# Patient Record
Sex: FEMALE | Race: WHITE | Employment: PART TIME | URBAN - METROPOLITAN AREA
[De-identification: names, ages, dates, MRNs, and addresses within clinical notes are randomized per-mention and may not be internally consistent; named-entity substitution may affect disease eponyms.]

---

## 2017-01-23 ENCOUNTER — OFFICE VISIT (OUTPATIENT)
Dept: FAMILY MEDICINE CLINIC | Facility: CLINIC | Age: 62
End: 2017-01-23

## 2017-01-23 VITALS
SYSTOLIC BLOOD PRESSURE: 120 MMHG | HEIGHT: 61 IN | HEART RATE: 76 BPM | BODY MASS INDEX: 36.44 KG/M2 | RESPIRATION RATE: 16 BRPM | DIASTOLIC BLOOD PRESSURE: 76 MMHG | WEIGHT: 193 LBS

## 2017-01-23 DIAGNOSIS — D22.9 CHANGE IN MOLE: Primary | ICD-10-CM

## 2017-01-23 PROCEDURE — 99212 OFFICE O/P EST SF 10 MIN: CPT | Performed by: FAMILY MEDICINE

## 2017-01-24 NOTE — PROGRESS NOTES
Patient presents with:  Derm Problem: skin tags- rib area and spot on neck  Diabetic Eye Exam: Pt states has appt 3/2017- letter sent to Pt MY CHART- Pt will bring to appt      HPI:   This is a 63year old female coming in for miles, skin tags on nec and c note and vitals reviewed.    small lesions, non cancerous on skin in chest  ASSESSMENT AND PLAN:     Problem List Items Addressed This Visit     None      Visit Diagnoses     Change in mole    -  Primary          Reassurance on these benign moles, will foll

## 2017-02-08 ENCOUNTER — APPOINTMENT (OUTPATIENT)
Dept: LAB | Age: 62
End: 2017-02-08
Attending: FAMILY MEDICINE
Payer: COMMERCIAL

## 2017-02-08 DIAGNOSIS — E11.9 TYPE 2 DIABETES MELLITUS WITHOUT COMPLICATION, WITHOUT LONG-TERM CURRENT USE OF INSULIN (HCC): ICD-10-CM

## 2017-02-08 DIAGNOSIS — E03.9 ACQUIRED HYPOTHYROIDISM: ICD-10-CM

## 2017-02-08 LAB
ALBUMIN SERPL-MCNC: 4.1 G/DL (ref 3.5–4.8)
ALP LIVER SERPL-CCNC: 71 U/L (ref 46–118)
ALT SERPL-CCNC: 30 U/L (ref 14–54)
AST SERPL-CCNC: 17 U/L (ref 15–41)
BILIRUB SERPL-MCNC: 0.6 MG/DL (ref 0.1–2)
BUN BLD-MCNC: 17 MG/DL (ref 8–20)
CALCIUM BLD-MCNC: 8.6 MG/DL (ref 8.3–10.3)
CHLORIDE: 101 MMOL/L (ref 101–111)
CHOLEST SMN-MCNC: 142 MG/DL (ref ?–200)
CO2: 28 MMOL/L (ref 22–32)
CREAT BLD-MCNC: 0.65 MG/DL (ref 0.55–1.02)
CREAT UR-SCNC: 34.6 MG/DL
EST. AVERAGE GLUCOSE BLD GHB EST-MCNC: 146 MG/DL (ref 68–126)
FREE T4: 1.2 NG/DL (ref 0.9–1.8)
GLUCOSE BLD-MCNC: 113 MG/DL (ref 70–99)
HBA1C MFR BLD HPLC: 6.7 % (ref ?–5.7)
HDLC SERPL-MCNC: 75 MG/DL (ref 45–?)
HDLC SERPL: 1.89 {RATIO} (ref ?–4.44)
LDLC SERPL CALC-MCNC: 48 MG/DL (ref ?–130)
M PROTEIN MFR SERPL ELPH: 7 G/DL (ref 6.1–8.3)
MICROALBUMIN UR-MCNC: <0.5 MG/DL
NONHDLC SERPL-MCNC: 67 MG/DL (ref ?–130)
POTASSIUM SERPL-SCNC: 4 MMOL/L (ref 3.6–5.1)
SODIUM SERPL-SCNC: 136 MMOL/L (ref 136–144)
TRIGLYCERIDES: 96 MG/DL (ref ?–150)
TSI SER-ACNC: 0.48 MIU/ML (ref 0.35–5.5)
VLDL: 19 MG/DL (ref 5–40)

## 2017-02-08 PROCEDURE — 80061 LIPID PANEL: CPT

## 2017-02-08 PROCEDURE — 82570 ASSAY OF URINE CREATININE: CPT

## 2017-02-08 PROCEDURE — 84443 ASSAY THYROID STIM HORMONE: CPT

## 2017-02-08 PROCEDURE — 82043 UR ALBUMIN QUANTITATIVE: CPT

## 2017-02-08 PROCEDURE — 36415 COLL VENOUS BLD VENIPUNCTURE: CPT

## 2017-02-08 PROCEDURE — 83036 HEMOGLOBIN GLYCOSYLATED A1C: CPT

## 2017-02-08 PROCEDURE — 84439 ASSAY OF FREE THYROXINE: CPT

## 2017-02-08 PROCEDURE — 80053 COMPREHEN METABOLIC PANEL: CPT

## 2017-02-15 ENCOUNTER — OFFICE VISIT (OUTPATIENT)
Dept: FAMILY MEDICINE CLINIC | Facility: CLINIC | Age: 62
End: 2017-02-15

## 2017-02-15 VITALS
HEART RATE: 64 BPM | BODY MASS INDEX: 36.06 KG/M2 | TEMPERATURE: 97 F | RESPIRATION RATE: 16 BRPM | HEIGHT: 61 IN | SYSTOLIC BLOOD PRESSURE: 122 MMHG | DIASTOLIC BLOOD PRESSURE: 82 MMHG | WEIGHT: 191 LBS

## 2017-02-15 DIAGNOSIS — E78.5 HYPERLIPIDEMIA WITH TARGET LDL LESS THAN 70: Chronic | ICD-10-CM

## 2017-02-15 DIAGNOSIS — I10 ESSENTIAL HYPERTENSION: Chronic | ICD-10-CM

## 2017-02-15 DIAGNOSIS — E03.9 ACQUIRED HYPOTHYROIDISM: ICD-10-CM

## 2017-02-15 DIAGNOSIS — E11.9 TYPE 2 DIABETES MELLITUS WITHOUT COMPLICATION, WITHOUT LONG-TERM CURRENT USE OF INSULIN (HCC): Primary | ICD-10-CM

## 2017-02-15 PROCEDURE — 99214 OFFICE O/P EST MOD 30 MIN: CPT | Performed by: FAMILY MEDICINE

## 2017-02-15 NOTE — PROGRESS NOTES
HPI:   Taqueria Lin is a 63year old female who presents for recheck of her diabetes.     Patient presents with:  Diabetes: 6 month f/u  Eye letter given     Diabetes Care Dilated Eye Exam due on 10/21/2016  Diabetes Care Foot Exam due on 02/11/2017     Macho Mora lb  01/23/17 : 193 lb  08/17/16 : 187 lb 3.2 oz    BP Readings from Last 3 Encounters:  02/15/17 : 122/82  01/23/17 : 120/76  08/17/16 : 130/80        HPI     Past History:   She  has a past medical history of Cataract (5/15/2014);  Diabetes [facility]) (May 2015 Take 1 Tab by mouth daily. B Complex Vitamins (B COMPLEX 1 OR) Take by mouth daily. No current facility-administered medications on file prior to visit.     Thyroid  (most recent labs)     Lab Results  Component Value Date/Time   TSH 0.479 02/08/201 noted. Recommendations are: continue present meds, lose weight by increased dietary compliance and exercise, see ophthalmology soon, check feet daily and will check labs as ordered.      As for her hypertension, Blood Pressure is well controlled, no sig

## 2017-02-15 NOTE — ASSESSMENT & PLAN NOTE
Stable, Continue present management.     Thyroid  (most recent labs)     Lab Results  Component Value Date/Time   TSH 0.479 02/08/2017 09:16 AM   T4F 1.2 02/08/2017 09:16 AM         Endocrine Medications          Levothyroxine Sodium 75 MCG Oral Tab

## 2017-04-01 ENCOUNTER — OFFICE VISIT (OUTPATIENT)
Dept: FAMILY MEDICINE CLINIC | Facility: CLINIC | Age: 62
End: 2017-04-01

## 2017-04-01 VITALS
WEIGHT: 195 LBS | HEART RATE: 81 BPM | DIASTOLIC BLOOD PRESSURE: 60 MMHG | OXYGEN SATURATION: 98 % | RESPIRATION RATE: 18 BRPM | SYSTOLIC BLOOD PRESSURE: 120 MMHG | BODY MASS INDEX: 37 KG/M2

## 2017-04-01 DIAGNOSIS — N30.01 ACUTE CYSTITIS WITH HEMATURIA: Primary | ICD-10-CM

## 2017-04-01 DIAGNOSIS — R30.9 PAIN PASSING URINE: ICD-10-CM

## 2017-04-01 PROCEDURE — 87086 URINE CULTURE/COLONY COUNT: CPT | Performed by: FAMILY MEDICINE

## 2017-04-01 PROCEDURE — 99213 OFFICE O/P EST LOW 20 MIN: CPT | Performed by: FAMILY MEDICINE

## 2017-04-01 PROCEDURE — 87186 SC STD MICRODIL/AGAR DIL: CPT | Performed by: FAMILY MEDICINE

## 2017-04-01 PROCEDURE — 81003 URINALYSIS AUTO W/O SCOPE: CPT | Performed by: FAMILY MEDICINE

## 2017-04-01 PROCEDURE — 87088 URINE BACTERIA CULTURE: CPT | Performed by: FAMILY MEDICINE

## 2017-04-01 RX ORDER — NITROFURANTOIN 25; 75 MG/1; MG/1
100 CAPSULE ORAL 2 TIMES DAILY
Qty: 14 CAPSULE | Refills: 0 | Status: SHIPPED | OUTPATIENT
Start: 2017-04-01 | End: 2017-08-16 | Stop reason: ALTCHOICE

## 2017-04-01 NOTE — PROGRESS NOTES
Kaden Trinidad is a 63year old female. S:  Patient presents today with the following concerns:  · Had chills last night. Thursday with dysuria and polyuria. Hematuria yesterday. Urinary urgency. No abdominal or back pain. No fevers. Nauseated.  No Problem Relation Age of Onset   • Arthritis Brother    • Breast Cancer Mother 78   • Heart Surgery Mother      CABG   • Other[Other] [OTHER] Mother    • Diabetes Sister    • Other[Other] [OTHER] Father      hepatic disorder   • High Cholesterol Daughter (100 mg total) by mouth 2 (two) times daily. Imaging & Consults:  None    Urine sent for culture. She is started on Macrobid 100 mg bid for 7 days. Encouraged fluids, urination when needed. Avoid caffeine.   Follow up if symptoms worsen or do not

## 2017-04-17 ENCOUNTER — TELEPHONE (OUTPATIENT)
Dept: FAMILY MEDICINE CLINIC | Facility: CLINIC | Age: 62
End: 2017-04-17

## 2017-04-17 NOTE — TELEPHONE ENCOUNTER
Patient saw Rebbeca Lesches and was diagnosed with UTI 2 weeks ago 4/1/17, but she is still having trouble when she goes to the bathroom she feels like she still needs to void.

## 2017-04-17 NOTE — TELEPHONE ENCOUNTER
Still having UTI  Symptoms frequency urgency and feeling that she does not empty her bladder after voiding will send to Ward Moya PA-c to see if she wants to treat

## 2017-04-18 RX ORDER — LEVOFLOXACIN 250 MG/1
250 TABLET ORAL DAILY
Qty: 5 TABLET | Refills: 0 | Status: SHIPPED | OUTPATIENT
Start: 2017-04-18 | End: 2017-08-16 | Stop reason: ALTCHOICE

## 2017-04-18 NOTE — TELEPHONE ENCOUNTER
Notified patient of new medication and need to be seen if not improving patient agrees to this plan.

## 2017-08-16 ENCOUNTER — OFFICE VISIT (OUTPATIENT)
Dept: FAMILY MEDICINE CLINIC | Facility: CLINIC | Age: 62
End: 2017-08-16

## 2017-08-16 VITALS
DIASTOLIC BLOOD PRESSURE: 80 MMHG | SYSTOLIC BLOOD PRESSURE: 110 MMHG | WEIGHT: 192.38 LBS | BODY MASS INDEX: 35.86 KG/M2 | HEART RATE: 68 BPM | RESPIRATION RATE: 14 BRPM | HEIGHT: 61.4 IN

## 2017-08-16 DIAGNOSIS — E11.9 TYPE 2 DIABETES MELLITUS WITHOUT COMPLICATION, WITHOUT LONG-TERM CURRENT USE OF INSULIN (HCC): Primary | ICD-10-CM

## 2017-08-16 DIAGNOSIS — E78.5 HYPERLIPIDEMIA WITH TARGET LDL LESS THAN 70: Chronic | ICD-10-CM

## 2017-08-16 DIAGNOSIS — Z12.31 VISIT FOR SCREENING MAMMOGRAM: ICD-10-CM

## 2017-08-16 DIAGNOSIS — E03.9 ACQUIRED HYPOTHYROIDISM: ICD-10-CM

## 2017-08-16 DIAGNOSIS — E11.59 HYPERTENSION ASSOCIATED WITH DIABETES (HCC): ICD-10-CM

## 2017-08-16 DIAGNOSIS — I15.2 HYPERTENSION ASSOCIATED WITH DIABETES (HCC): ICD-10-CM

## 2017-08-16 DIAGNOSIS — I10 ESSENTIAL HYPERTENSION: Chronic | ICD-10-CM

## 2017-08-16 LAB
CARTRIDGE LOT#: ABNORMAL NUMERIC
HEMOGLOBIN A1C: 6.7 % (ref 4.3–5.6)

## 2017-08-16 PROCEDURE — 99214 OFFICE O/P EST MOD 30 MIN: CPT | Performed by: FAMILY MEDICINE

## 2017-08-16 RX ORDER — LEVOTHYROXINE SODIUM 0.07 MG/1
75 TABLET ORAL DAILY
Qty: 90 TABLET | Refills: 3 | Status: SHIPPED | OUTPATIENT
Start: 2017-08-16 | End: 2018-08-01

## 2017-08-16 RX ORDER — LISINOPRIL 5 MG/1
5 TABLET ORAL DAILY
Qty: 90 TABLET | Refills: 3 | Status: SHIPPED | OUTPATIENT
Start: 2017-08-16 | End: 2018-08-01

## 2017-08-16 RX ORDER — ATORVASTATIN CALCIUM 10 MG/1
10 TABLET, FILM COATED ORAL DAILY
Qty: 90 TABLET | Refills: 3 | Status: SHIPPED | OUTPATIENT
Start: 2017-08-16 | End: 2018-08-01

## 2017-08-16 RX ORDER — METFORMIN HYDROCHLORIDE 500 MG/1
1000 TABLET, EXTENDED RELEASE ORAL 2 TIMES DAILY WITH MEALS
Qty: 360 TABLET | Refills: 3 | Status: SHIPPED | OUTPATIENT
Start: 2017-08-16 | End: 2018-08-01

## 2017-08-16 NOTE — PROGRESS NOTES
HPI:   Tammy Matute is a 63year old female who presents for recheck of her diabetes.   Stable labs, but repeat due today with A1c 6.7%  Patient presents with:  Diabetes: 6 month visit - Patient is fasting    Diabetes Care Dilated Eye Exam due on 10/21/201 0.65 02/08/2017 09:16 AM   GFR 97 02/08/2017 09:16 AM         Wt Readings from Last 3 Encounters:  08/16/17 : 192 lb 6.4 oz  04/01/17 : 195 lb  02/15/17 : 191 lb    BP Readings from Last 3 Encounters:  08/16/17 : 110/80  04/01/17 : 120/60  02/15/17 : 122/8 Levothyroxine Sodium 75 MCG Oral Tab Take 1 tablet (75 mcg total) by mouth daily. [DISCONTINUED] Atorvastatin Calcium 10 MG Oral Tab Take 1 tablet (10 mg total) by mouth daily.    [DISCONTINUED] lisinopril 5 MG Oral Tab Take 1 tablet (5 mg total) by mouth noted. Recommendations are: continue present meds, lose weight by increased dietary compliance and exercise, see ophthalmology soon, check feet daily and will check labs as ordered.      As for her hypertension, Blood Pressure is well controlled, no sig MICROALB/CREAT RATIO, RANDOM URINE      Other Visit Diagnoses     Hypertension associated with diabetes ([facility] [address]        Relevant Medications    lisinopril 5 MG Oral Tab    MetFORMIN HCl  MG Oral Tablet 24 Hr    Visit for screening mammogram        Relev

## 2017-09-27 ENCOUNTER — TELEPHONE (OUTPATIENT)
Dept: FAMILY MEDICINE CLINIC | Facility: CLINIC | Age: 62
End: 2017-09-27

## 2017-09-27 NOTE — TELEPHONE ENCOUNTER
Patient is scheduled for R eye surgery on 11/15/17 with Dr. Fransisco Boyd. LM asking patient to contact our office to schedule appointment.

## 2017-11-11 ENCOUNTER — OFFICE VISIT (OUTPATIENT)
Dept: FAMILY MEDICINE CLINIC | Facility: CLINIC | Age: 62
End: 2017-11-11

## 2017-11-11 VITALS
SYSTOLIC BLOOD PRESSURE: 110 MMHG | WEIGHT: 195.38 LBS | RESPIRATION RATE: 16 BRPM | DIASTOLIC BLOOD PRESSURE: 70 MMHG | HEIGHT: 61 IN | TEMPERATURE: 97 F | HEART RATE: 72 BPM | BODY MASS INDEX: 36.89 KG/M2

## 2017-11-11 DIAGNOSIS — I10 ESSENTIAL HYPERTENSION: Chronic | ICD-10-CM

## 2017-11-11 DIAGNOSIS — H25.9 SENILE CATARACT OF RIGHT EYE, UNSPECIFIED AGE-RELATED CATARACT TYPE: Primary | ICD-10-CM

## 2017-11-11 DIAGNOSIS — E03.9 ACQUIRED HYPOTHYROIDISM: ICD-10-CM

## 2017-11-11 DIAGNOSIS — E78.5 HYPERLIPIDEMIA WITH TARGET LDL LESS THAN 70: Chronic | ICD-10-CM

## 2017-11-11 DIAGNOSIS — Z01.818 PREOPERATIVE CLEARANCE: ICD-10-CM

## 2017-11-11 DIAGNOSIS — E11.9 TYPE 2 DIABETES MELLITUS WITHOUT COMPLICATION, WITHOUT LONG-TERM CURRENT USE OF INSULIN (HCC): ICD-10-CM

## 2017-11-11 DIAGNOSIS — Z00.00 LABORATORY EXAMINATION ORDERED AS PART OF A ROUTINE GENERAL MEDICAL EXAMINATION: ICD-10-CM

## 2017-11-11 PROCEDURE — 99243 OFF/OP CNSLTJ NEW/EST LOW 30: CPT | Performed by: FAMILY MEDICINE

## 2017-11-11 RX ORDER — OFLOXACIN 3 MG/ML
SOLUTION/ DROPS OPHTHALMIC
COMMUNITY
Start: 2017-11-01 | End: 2018-02-14 | Stop reason: ALTCHOICE

## 2017-11-11 RX ORDER — PREDNISOLONE ACETATE 10 MG/ML
SUSPENSION/ DROPS OPHTHALMIC
COMMUNITY
Start: 2017-11-01 | End: 2018-02-14 | Stop reason: ALTCHOICE

## 2017-11-11 NOTE — H&P
Lindsey Hebert is a 63year old female who presents for a pre-operative physical exam at the request of Dr. Kaila Magallon for evaluation of preoperative risk. Patient is to have R cataract, to be done by Dr. Kaila Magallon  at [facility] on 11/15/2017.     Pt family history includes Arthritis in her brother, brother and another family member; Breast Cancer (age of onset: 79) in her maternal aunt; Breast Cancer (age of onset: 78) in her mother; Cancer in her maternal grandfather, maternal grandfather, maternal g no dominant or suspicious mass  LUNGS: clear to auscultation  CARDIO: RRR without murmur  GI: good BS's,no masses, HSM or tenderness  : deferred  EXTREMITIES: edema  NEURO: Oriented times three,cranial nerves are intact,motor and sensory are grossly [place] the referring physician, Dr. Jami Doran. She is overall doing well, no cardiac symptoms and very well controlled type 2 diabetes with a normal EKG from last year and very low risk for heart disease.   I do not feel she needs any further lab testing and I

## 2017-11-11 NOTE — PATIENT INSTRUCTIONS
Current Outpatient Prescriptions on File Prior to Visit:  Levothyroxine Sodium 75 MCG Oral Tab OK   atorvastatin 10 MG Oral Tab OK   aspirin 81 MG Oral Tab    Cholecalciferol (VITAMIN D) 1000 UNITS Oral Cap    B Complex Vitamins (B COMPLEX 1 OR)    lisin

## 2017-11-29 PROBLEM — Z98.42: Status: ACTIVE | Noted: 2017-11-29

## 2017-11-29 PROBLEM — Z98.41: Status: ACTIVE | Noted: 2017-11-29

## 2018-02-07 ENCOUNTER — LAB ENCOUNTER (OUTPATIENT)
Dept: LAB | Age: 63
End: 2018-02-07
Attending: FAMILY MEDICINE
Payer: COMMERCIAL

## 2018-02-07 DIAGNOSIS — E03.9 ACQUIRED HYPOTHYROIDISM: ICD-10-CM

## 2018-02-07 DIAGNOSIS — Z00.00 LABORATORY EXAMINATION ORDERED AS PART OF A ROUTINE GENERAL MEDICAL EXAMINATION: ICD-10-CM

## 2018-02-07 DIAGNOSIS — E11.9 TYPE 2 DIABETES MELLITUS WITHOUT COMPLICATION, WITHOUT LONG-TERM CURRENT USE OF INSULIN (HCC): ICD-10-CM

## 2018-02-07 LAB
ALBUMIN SERPL-MCNC: 4.1 G/DL (ref 3.5–4.8)
ALP LIVER SERPL-CCNC: 62 U/L (ref 46–118)
ALT SERPL-CCNC: 31 U/L (ref 14–54)
AST SERPL-CCNC: 17 U/L (ref 15–41)
BASOPHILS # BLD AUTO: 0.05 X10(3) UL (ref 0–0.1)
BASOPHILS NFR BLD AUTO: 1.3 %
BILIRUB SERPL-MCNC: 0.6 MG/DL (ref 0.1–2)
BUN BLD-MCNC: 14 MG/DL (ref 8–20)
CALCIUM BLD-MCNC: 9.5 MG/DL (ref 8.3–10.3)
CHLORIDE: 105 MMOL/L (ref 101–111)
CHOLEST SMN-MCNC: 132 MG/DL (ref ?–200)
CO2: 27 MMOL/L (ref 22–32)
CREAT BLD-MCNC: 0.56 MG/DL (ref 0.55–1.02)
CREAT UR-SCNC: 48.7 MG/DL
EOSINOPHIL # BLD AUTO: 0.2 X10(3) UL (ref 0–0.3)
EOSINOPHIL NFR BLD AUTO: 5.3 %
ERYTHROCYTE [DISTWIDTH] IN BLOOD BY AUTOMATED COUNT: 12.4 % (ref 11.5–16)
EST. AVERAGE GLUCOSE BLD GHB EST-MCNC: 163 MG/DL (ref 68–126)
FREE T4: 1 NG/DL (ref 0.9–1.8)
GLUCOSE BLD-MCNC: 124 MG/DL (ref 70–99)
HBA1C MFR BLD HPLC: 7.3 % (ref ?–5.7)
HCT VFR BLD AUTO: 38.9 % (ref 34–50)
HDLC SERPL-MCNC: 67 MG/DL (ref 45–?)
HDLC SERPL: 1.97 {RATIO} (ref ?–4.44)
HGB BLD-MCNC: 12.7 G/DL (ref 12–16)
IMMATURE GRANULOCYTE COUNT: 0 X10(3) UL (ref 0–1)
IMMATURE GRANULOCYTE RATIO %: 0 %
LDLC SERPL CALC-MCNC: 45 MG/DL (ref ?–130)
LYMPHOCYTES # BLD AUTO: 1.19 X10(3) UL (ref 0.9–4)
LYMPHOCYTES NFR BLD AUTO: 31.6 %
M PROTEIN MFR SERPL ELPH: 6.9 G/DL (ref 6.1–8.3)
MCH RBC QN AUTO: 29.6 PG (ref 27–33.2)
MCHC RBC AUTO-ENTMCNC: 32.6 G/DL (ref 31–37)
MCV RBC AUTO: 90.7 FL (ref 81–100)
MICROALBUMIN UR-MCNC: <0.5 MG/DL
MONOCYTES # BLD AUTO: 0.32 X10(3) UL (ref 0.1–0.6)
MONOCYTES NFR BLD AUTO: 8.5 %
NEUTROPHIL ABS PRELIM: 2.01 X10 (3) UL (ref 1.3–6.7)
NEUTROPHILS # BLD AUTO: 2.01 X10(3) UL (ref 1.3–6.7)
NEUTROPHILS NFR BLD AUTO: 53.3 %
NONHDLC SERPL-MCNC: 65 MG/DL (ref ?–130)
PLATELET # BLD AUTO: 188 10(3)UL (ref 150–450)
POTASSIUM SERPL-SCNC: 4.1 MMOL/L (ref 3.6–5.1)
RBC # BLD AUTO: 4.29 X10(6)UL (ref 3.8–5.1)
RED CELL DISTRIBUTION WIDTH-SD: 40.7 FL (ref 35.1–46.3)
SODIUM SERPL-SCNC: 138 MMOL/L (ref 136–144)
TRIGL SERPL-MCNC: 100 MG/DL (ref ?–150)
TSI SER-ACNC: 0.4 MIU/ML (ref 0.35–5.5)
VLDLC SERPL CALC-MCNC: 20 MG/DL (ref 5–40)
WBC # BLD AUTO: 3.8 X10(3) UL (ref 4–13)

## 2018-02-07 PROCEDURE — 36415 COLL VENOUS BLD VENIPUNCTURE: CPT

## 2018-02-07 PROCEDURE — 82043 UR ALBUMIN QUANTITATIVE: CPT

## 2018-02-07 PROCEDURE — 85025 COMPLETE CBC W/AUTO DIFF WBC: CPT

## 2018-02-07 PROCEDURE — 83036 HEMOGLOBIN GLYCOSYLATED A1C: CPT

## 2018-02-07 PROCEDURE — 84443 ASSAY THYROID STIM HORMONE: CPT

## 2018-02-07 PROCEDURE — 84439 ASSAY OF FREE THYROXINE: CPT

## 2018-02-07 PROCEDURE — 82570 ASSAY OF URINE CREATININE: CPT

## 2018-02-07 PROCEDURE — 80061 LIPID PANEL: CPT

## 2018-02-07 PROCEDURE — 80053 COMPREHEN METABOLIC PANEL: CPT

## 2018-02-14 ENCOUNTER — OFFICE VISIT (OUTPATIENT)
Dept: FAMILY MEDICINE CLINIC | Facility: CLINIC | Age: 63
End: 2018-02-14

## 2018-02-14 VITALS
TEMPERATURE: 98 F | SYSTOLIC BLOOD PRESSURE: 124 MMHG | HEART RATE: 74 BPM | RESPIRATION RATE: 16 BRPM | WEIGHT: 197 LBS | DIASTOLIC BLOOD PRESSURE: 78 MMHG | BODY MASS INDEX: 37.19 KG/M2 | HEIGHT: 61 IN

## 2018-02-14 DIAGNOSIS — I10 ESSENTIAL HYPERTENSION: Chronic | ICD-10-CM

## 2018-02-14 DIAGNOSIS — G35 MULTIPLE SCLEROSIS (HCC): Chronic | ICD-10-CM

## 2018-02-14 DIAGNOSIS — E66.01 SEVERE OBESITY (BMI 35.0-35.9 WITH COMORBIDITY) (HCC): ICD-10-CM

## 2018-02-14 DIAGNOSIS — E11.9 TYPE 2 DIABETES MELLITUS WITHOUT COMPLICATION, WITHOUT LONG-TERM CURRENT USE OF INSULIN (HCC): ICD-10-CM

## 2018-02-14 DIAGNOSIS — E03.9 ACQUIRED HYPOTHYROIDISM: ICD-10-CM

## 2018-02-14 DIAGNOSIS — Z12.31 VISIT FOR SCREENING MAMMOGRAM: ICD-10-CM

## 2018-02-14 DIAGNOSIS — E78.5 HYPERLIPIDEMIA WITH TARGET LDL LESS THAN 70: Chronic | ICD-10-CM

## 2018-02-14 DIAGNOSIS — Z00.00 ANNUAL PHYSICAL EXAM: Primary | ICD-10-CM

## 2018-02-14 PROBLEM — Z98.42: Status: RESOLVED | Noted: 2017-11-29 | Resolved: 2018-02-14

## 2018-02-14 PROBLEM — Z98.41: Status: RESOLVED | Noted: 2017-11-29 | Resolved: 2018-02-14

## 2018-02-14 PROCEDURE — 90471 IMMUNIZATION ADMIN: CPT | Performed by: FAMILY MEDICINE

## 2018-02-14 PROCEDURE — 90715 TDAP VACCINE 7 YRS/> IM: CPT | Performed by: FAMILY MEDICINE

## 2018-02-14 PROCEDURE — 90732 PPSV23 VACC 2 YRS+ SUBQ/IM: CPT | Performed by: FAMILY MEDICINE

## 2018-02-14 PROCEDURE — 90472 IMMUNIZATION ADMIN EACH ADD: CPT | Performed by: FAMILY MEDICINE

## 2018-02-14 PROCEDURE — 99396 PREV VISIT EST AGE 40-64: CPT | Performed by: FAMILY MEDICINE

## 2018-02-14 NOTE — PATIENT INSTRUCTIONS
Benign Paroxysmal Positional Vertigo     Your health care provider may move your head in certain ways to treat your BPPV. Benign paroxysmal positional vertigo (BPPV) is a problem with the inner ear. The inner ear contains the vestibular system.  This Treatment for BPPV  Your healthcare provider may try to move the calcium crystals. This is done by having you move your head and neck in certain ways. This treatment is safe and often works well. You may also be told to do these movements at home.  You may

## 2018-02-14 NOTE — PROGRESS NOTES
Nam Valdes is a 63year old female who presents for a complete physical exam.   HPI:   Patient presents with:  Physical: WWE no pap.       Her last annual assessment has been over 1 year: Annual Physical due on 08/17/2017          Symptoms: is menopausal TP 6.9 02/07/2018 08:49 AM   ALKPHO 62 02/07/2018 08:49 AM   AST 17 02/07/2018 08:49 AM   ALT 31 02/07/2018 08:49 AM   BILT 0.6 02/07/2018 08:49 AM   TSH 0.398 02/07/2018 08:49 AM   T4F 1.0 02/07/2018 08:49 AM          Lab Results  Component Value Date/T right (); colonoscopy; ; other surgical history (1986); and cataract (414065).    Her family history includes Arthritis in her brother, brother and another family member; Breast Cancer (age of onset: 79) in her maternal aunt; Breast Cancer (age Wt 197 lb   BMI 37.22 kg/m²  Estimated body mass index is 37.22 kg/m² as calculated from the following:    Height as of this encounter: 61\". Weight as of this encounter: 197 lb. Physical Exam   Nursing note and vitals reviewed.    Constitutional: She Integrity: Negative for ulcer. Left Foot:   Protective Sensation: 6 sites tested. 6 sites sensed. Skin Integrity: Negative for ulcer. Lymphadenopathy:     She has no cervical adenopathy. She has no axillary adenopathy.    Neurological: She is aler Overview     Lisinopril 5         Current Assessment & Plan     Stable, Continue present management.     Blood Pressure and Cardiac Medications          lisinopril 5 MG Oral Tab                    Endocrine    Acquired hypothyroidism    Overview     Levothy

## 2018-02-15 NOTE — ASSESSMENT & PLAN NOTE
Stable, Continue present management.     Thyroid  (most recent labs)     Lab Results  Component Value Date/Time   TSH 0.398 02/07/2018 08:49 AM   T4F 1.0 02/07/2018 08:49 AM         Endocrine Medications          Levothyroxine Sodium 75 MCG Oral Tab

## 2018-02-15 NOTE — ASSESSMENT & PLAN NOTE
Stable, Continue present management.     Blood Pressure and Cardiac Medications          lisinopril 5 MG Oral Tab

## 2018-02-15 NOTE — ASSESSMENT & PLAN NOTE
Stable, Continue present management.     Cholesterol Lowering Medications          atorvastatin 10 MG Oral Tab

## 2018-02-19 ENCOUNTER — HOSPITAL ENCOUNTER (OUTPATIENT)
Dept: GENERAL RADIOLOGY | Age: 63
Discharge: HOME | End: 2018-02-19
Attending: PHYSICIAN ASSISTANT
Payer: COMMERCIAL

## 2018-02-19 ENCOUNTER — OFFICE VISIT (OUTPATIENT)
Dept: FAMILY MEDICINE CLINIC | Facility: CLINIC | Age: 63
End: 2018-02-19

## 2018-02-19 VITALS — DIASTOLIC BLOOD PRESSURE: 80 MMHG | HEART RATE: 84 BPM | SYSTOLIC BLOOD PRESSURE: 130 MMHG | TEMPERATURE: 98 F

## 2018-02-19 DIAGNOSIS — M79.672 LEFT FOOT PAIN: Primary | ICD-10-CM

## 2018-02-19 DIAGNOSIS — M79.672 LEFT FOOT PAIN: ICD-10-CM

## 2018-02-19 DIAGNOSIS — S92.352K CLOSED DISPLACED FRACTURE OF FIFTH METATARSAL BONE OF LEFT FOOT WITH NONUNION, SUBSEQUENT ENCOUNTER: Primary | ICD-10-CM

## 2018-02-19 PROCEDURE — 73630 X-RAY EXAM OF FOOT: CPT | Performed by: PHYSICIAN ASSISTANT

## 2018-02-19 PROCEDURE — 99213 OFFICE O/P EST LOW 20 MIN: CPT | Performed by: PHYSICIAN ASSISTANT

## 2018-02-19 NOTE — PROGRESS NOTES
CC:  Patient presents with: Foot Pain: slipped this morning and twisted left foot,  pt having more pain on left side of foot, painful to walk      HPI: Leandra Mcclelland presents for complaints of left foot pain after falling this morning.  She was reaching for a [facility] mcg total) by mouth daily. Disp: 90 tablet Rfl: 3   lisinopril 5 MG Oral Tab Take 1 tablet (5 mg total) by mouth daily. Disp: 90 tablet Rfl: 3   atorvastatin 10 MG Oral Tab Take 1 tablet (10 mg total) by mouth daily.  Disp: 90 tablet Rfl: 3   MetFORMIN HCl open wounds or other deformities. ROM is slow but full.  CV/neuro grossly intact  Neurological:  A&O x 3; normal gait and balance  Skin: Warm, dry, no rashes, lesions, or discoloration  Psychiatric: Normal mood, affect, behavior, judgement, and thought cont

## 2018-03-28 DIAGNOSIS — E11.9 TYPE 2 DIABETES MELLITUS WITHOUT COMPLICATION, WITHOUT LONG-TERM CURRENT USE OF INSULIN (HCC): ICD-10-CM

## 2018-03-28 RX ORDER — BLOOD SUGAR DIAGNOSTIC
STRIP MISCELLANEOUS
Qty: 100 STRIP | Refills: 3 | Status: SHIPPED | OUTPATIENT
Start: 2018-03-28 | End: 2019-08-21

## 2018-05-09 ENCOUNTER — OFFICE VISIT (OUTPATIENT)
Dept: FAMILY MEDICINE CLINIC | Facility: CLINIC | Age: 63
End: 2018-05-09

## 2018-05-09 VITALS
RESPIRATION RATE: 16 BRPM | SYSTOLIC BLOOD PRESSURE: 128 MMHG | BODY MASS INDEX: 36.63 KG/M2 | DIASTOLIC BLOOD PRESSURE: 80 MMHG | WEIGHT: 194 LBS | HEIGHT: 61 IN | HEART RATE: 72 BPM | TEMPERATURE: 99 F

## 2018-05-09 DIAGNOSIS — E11.9 TYPE 2 DIABETES MELLITUS WITHOUT COMPLICATION, WITHOUT LONG-TERM CURRENT USE OF INSULIN (HCC): Primary | ICD-10-CM

## 2018-05-09 DIAGNOSIS — E03.9 ACQUIRED HYPOTHYROIDISM: ICD-10-CM

## 2018-05-09 DIAGNOSIS — E78.5 HYPERLIPIDEMIA WITH TARGET LDL LESS THAN 70: Chronic | ICD-10-CM

## 2018-05-09 DIAGNOSIS — I10 ESSENTIAL HYPERTENSION: Chronic | ICD-10-CM

## 2018-05-09 DIAGNOSIS — Z12.31 VISIT FOR SCREENING MAMMOGRAM: ICD-10-CM

## 2018-05-09 PROCEDURE — 99214 OFFICE O/P EST MOD 30 MIN: CPT | Performed by: FAMILY MEDICINE

## 2018-05-09 PROCEDURE — 83036 HEMOGLOBIN GLYCOSYLATED A1C: CPT | Performed by: FAMILY MEDICINE

## 2018-05-09 NOTE — PROGRESS NOTES
HPI:   Dolores Sumner is a 63year old female who presents for recheck of her diabetes. A1c 7.2%, but fx foot fx 3 months ago and just back with activity last 3 weeks.   Patient presents with:  Diabetes: 3 month f/u     Mammogram,1 Yr due on 09/03/2017 Lipid panel service date 2/7/2018       Last refreshed: 5/9/2018 12:47 PM:  T4 View Report for additional information:  1.0 ng/dL 2/7/2018:  3mo ago      Last refreshed: 5/9/2018 12:47 PM:  TSH View Report for additional information:  0.398 mIU/mL 2/7/2018 0.398 02/07/2018 08:49 AM   BUN 14 02/07/2018 08:49 AM   CREATSERUM 0.56 02/07/2018 08:49 AM   GFR 97 02/08/2017 09:16 AM         Wt Readings from Last 3 Encounters:  05/09/18 : 194 lb  02/14/18 : 197 lb  11/11/17 : 195 lb 6.4 oz    BP Readings from Last 3 Cholecalciferol (VITAMIN D) 1000 UNITS Oral Cap Take 5,000 mg by mouth. Multiple Vitamin (MULTIVITAMINS) Oral Tab Take 1 Tab by mouth daily. B Complex Vitamins (B COMPLEX 1 OR) Take by mouth daily.        No current facility-administered medications o Her speech is normal and behavior is normal. Judgment and thought content normal. Cognition and memory are normal.      Nursing note and vitals reviewed.        ASSESSMENT AND PLAN:   As for her Diabetes, it is well controlled, no significant medication anastasia

## 2018-06-09 ENCOUNTER — HOSPITAL ENCOUNTER (OUTPATIENT)
Dept: MAMMOGRAPHY | Age: 63
Discharge: HOME | End: 2018-06-09
Attending: FAMILY MEDICINE
Payer: COMMERCIAL

## 2018-06-09 DIAGNOSIS — Z12.31 VISIT FOR SCREENING MAMMOGRAM: ICD-10-CM

## 2018-06-09 PROCEDURE — 77067 SCR MAMMO BI INCL CAD: CPT | Performed by: FAMILY MEDICINE

## 2018-08-01 ENCOUNTER — OFFICE VISIT (OUTPATIENT)
Dept: FAMILY MEDICINE CLINIC | Facility: CLINIC | Age: 63
End: 2018-08-01
Payer: COMMERCIAL

## 2018-08-01 VITALS
SYSTOLIC BLOOD PRESSURE: 124 MMHG | TEMPERATURE: 99 F | WEIGHT: 188.19 LBS | RESPIRATION RATE: 14 BRPM | BODY MASS INDEX: 35.53 KG/M2 | HEART RATE: 60 BPM | DIASTOLIC BLOOD PRESSURE: 80 MMHG | HEIGHT: 61 IN

## 2018-08-01 DIAGNOSIS — Z11.59 ENCOUNTER FOR HEPATITIS C SCREENING TEST FOR LOW RISK PATIENT: ICD-10-CM

## 2018-08-01 DIAGNOSIS — I10 ESSENTIAL HYPERTENSION: Chronic | ICD-10-CM

## 2018-08-01 DIAGNOSIS — E66.01 SEVERE OBESITY (BMI 35.0-35.9 WITH COMORBIDITY) (HCC): ICD-10-CM

## 2018-08-01 DIAGNOSIS — I15.2 HYPERTENSION ASSOCIATED WITH DIABETES (HCC): ICD-10-CM

## 2018-08-01 DIAGNOSIS — E11.59 HYPERTENSION ASSOCIATED WITH DIABETES (HCC): ICD-10-CM

## 2018-08-01 DIAGNOSIS — E11.9 TYPE 2 DIABETES MELLITUS WITHOUT COMPLICATION, WITHOUT LONG-TERM CURRENT USE OF INSULIN (HCC): Primary | ICD-10-CM

## 2018-08-01 DIAGNOSIS — Z00.00 LABORATORY EXAMINATION ORDERED AS PART OF A ROUTINE GENERAL MEDICAL EXAMINATION: ICD-10-CM

## 2018-08-01 DIAGNOSIS — E03.9 ACQUIRED HYPOTHYROIDISM: ICD-10-CM

## 2018-08-01 DIAGNOSIS — E78.5 HYPERLIPIDEMIA WITH TARGET LDL LESS THAN 70: Chronic | ICD-10-CM

## 2018-08-01 LAB
CARTRIDGE LOT#: ABNORMAL NUMERIC
HEMOGLOBIN A1C: 6.6 % (ref 4.3–5.6)

## 2018-08-01 PROCEDURE — 83036 HEMOGLOBIN GLYCOSYLATED A1C: CPT | Performed by: FAMILY MEDICINE

## 2018-08-01 PROCEDURE — 99214 OFFICE O/P EST MOD 30 MIN: CPT | Performed by: FAMILY MEDICINE

## 2018-08-01 RX ORDER — LISINOPRIL 5 MG/1
5 TABLET ORAL DAILY
Qty: 90 TABLET | Refills: 3 | Status: SHIPPED | OUTPATIENT
Start: 2018-08-01 | End: 2019-08-21

## 2018-08-01 RX ORDER — METFORMIN HYDROCHLORIDE 500 MG/1
1000 TABLET, EXTENDED RELEASE ORAL
Qty: 180 TABLET | Refills: 3 | Status: SHIPPED | OUTPATIENT
Start: 2018-08-01 | End: 2019-08-21

## 2018-08-01 RX ORDER — LEVOTHYROXINE SODIUM 0.07 MG/1
75 TABLET ORAL DAILY
Qty: 90 TABLET | Refills: 3 | Status: SHIPPED | OUTPATIENT
Start: 2018-08-01 | End: 2019-08-21

## 2018-08-01 RX ORDER — ATORVASTATIN CALCIUM 10 MG/1
10 TABLET, FILM COATED ORAL DAILY
Qty: 90 TABLET | Refills: 3 | Status: SHIPPED | OUTPATIENT
Start: 2018-08-01 | End: 2019-08-21

## 2018-08-01 NOTE — PROGRESS NOTES
HPI:   Gonzalo Joyce is a 66year old female who presents for recheck of her diabetes.     Patient presents with:  Diabetes: 3 month check  Medication Request: refills pended for levothyroxine and atorvastatin    There are no preventive care reminders to [employer] refreshed: 8/1/2018  8:33 AM:  Lipid panel service date 2/7/2018       Last refreshed: 8/1/2018  8:33 AM:  T4 View Report for additional information:  1.0 ng/dL 2/7/2018:  5mo ago      Last refreshed: 8/1/2018  8:33 AM:  TSH View Report for additional info 1.0 02/07/2018 08:49 AM   TSH 0.398 02/07/2018 08:49 AM   BUN 14 02/07/2018 08:49 AM   CREATSERUM 0.56 02/07/2018 08:49 AM   GFR 97 02/08/2017 09:16 AM         Wt Readings from Last 3 Encounters:  08/01/18 : 188 lb 3.2 oz  05/09/18 : 194 lb  02/14/18 : 197 Tab Take 1 tablet (5 mg total) by mouth daily. [DISCONTINUED] atorvastatin 10 MG Oral Tab Take 1 tablet (10 mg total) by mouth daily.    [DISCONTINUED] MetFORMIN HCl  MG Oral Tablet 24 Hr Take 2 tablets (1,000 mg total) by mouth 2 (two) times daily Musculoskeletal:        Right foot: There is no deformity. Left foot: There is no deformity. Feet: diabetic foot exam performed    Right Foot:   Protective Sensation: 6 sites tested. 6 sites sensed. Skin Integrity: Negative for ulcer.    Left F Levothyroxine Sodium 75 MCG Oral Tab    Other Relevant Orders    ASSAY, THYROID STIM HORMONE    FREE T4 (FREE THYROXINE)    Type 2 diabetes mellitus ([facility] - Primary    Overview     Dx 2013, Metformin 1000 daily         Current Assessment & Plan     As for

## 2018-08-01 NOTE — ASSESSMENT & PLAN NOTE
Stable, Continue present management.     Thyroid  (most recent labs)     Lab Results  Component Value Date/Time   TSH 0.398 02/07/2018 08:49 AM   T4F 1.0 02/07/2018 08:49 AM   TSHT4 0.51 02/01/2014 08:58 AM         Endocrine Medications          Levothyroxi

## 2018-08-17 ENCOUNTER — TELEPHONE (OUTPATIENT)
Dept: FAMILY MEDICINE CLINIC | Facility: CLINIC | Age: 63
End: 2018-08-17

## 2018-08-17 ENCOUNTER — LAB ENCOUNTER (OUTPATIENT)
Dept: LAB | Age: 63
End: 2018-08-17
Attending: FAMILY MEDICINE
Payer: COMMERCIAL

## 2018-08-17 DIAGNOSIS — R30.0 DYSURIA: Primary | ICD-10-CM

## 2018-08-17 DIAGNOSIS — R30.0 DYSURIA: ICD-10-CM

## 2018-08-17 LAB
BILIRUB UR QL STRIP.AUTO: NEGATIVE
COLOR UR AUTO: YELLOW
GLUCOSE UR STRIP.AUTO-MCNC: NEGATIVE MG/DL
KETONES UR STRIP.AUTO-MCNC: NEGATIVE MG/DL
NITRITE UR QL STRIP.AUTO: NEGATIVE
PH UR STRIP.AUTO: 5 [PH] (ref 4.5–8)
PROT UR STRIP.AUTO-MCNC: 30 MG/DL
SP GR UR STRIP.AUTO: 1.02 (ref 1–1.03)
UROBILINOGEN UR STRIP.AUTO-MCNC: <2 MG/DL
WBC #/AREA URNS AUTO: >50 /HPF

## 2018-08-17 PROCEDURE — 87086 URINE CULTURE/COLONY COUNT: CPT

## 2018-08-17 PROCEDURE — 81001 URINALYSIS AUTO W/SCOPE: CPT

## 2018-08-17 RX ORDER — SULFAMETHOXAZOLE AND TRIMETHOPRIM 800; 160 MG/1; MG/1
1 TABLET ORAL 2 TIMES DAILY
Qty: 20 TABLET | Refills: 0 | Status: SHIPPED | OUTPATIENT
Start: 2018-08-17 | End: 2018-08-27

## 2018-08-17 NOTE — TELEPHONE ENCOUNTER
Called and talked to patient and informed her of new orders for UA/C&S and antibiotic she will get this done and  meds

## 2018-09-26 ENCOUNTER — HOSPITAL ENCOUNTER (OUTPATIENT)
Dept: MRI IMAGING | Facility: HOSPITAL | Age: 63
Discharge: HOME | End: 2018-09-26
Attending: Other
Payer: COMMERCIAL

## 2018-09-26 DIAGNOSIS — G35 MULTIPLE SCLEROSIS (HCC): ICD-10-CM

## 2018-09-26 PROCEDURE — 70551 MRI BRAIN STEM W/O DYE: CPT | Performed by: OTHER

## 2018-11-12 ENCOUNTER — TELEPHONE (OUTPATIENT)
Dept: FAMILY MEDICINE CLINIC | Facility: CLINIC | Age: 63
End: 2018-11-12

## 2018-11-12 NOTE — TELEPHONE ENCOUNTER
Patient is scheduled for her flu shot 11/15/18 and states that last time she needed approval from Dr. Eddy Sinha to get the shot due to [address].  Patient would like to make sure she is approved to get the vaccine on Thursday

## 2018-11-12 NOTE — TELEPHONE ENCOUNTER
Patient would like to get her annual flu vaccine however, has Dx Multiple Sclerosis and would like to get Dr Luis Antonio Abdul' approval.     The 2018-19 inactivated seasonal flu immunization is recommended by the Centers for Disease Control and Prevention (CDC) for ev

## 2018-11-15 ENCOUNTER — IMMUNIZATION (OUTPATIENT)
Dept: FAMILY MEDICINE CLINIC | Facility: CLINIC | Age: 63
End: 2018-11-15
Payer: COMMERCIAL

## 2018-11-15 DIAGNOSIS — Z23 NEED FOR VACCINATION: ICD-10-CM

## 2018-11-15 PROCEDURE — 90471 IMMUNIZATION ADMIN: CPT | Performed by: FAMILY MEDICINE

## 2018-11-15 PROCEDURE — 90686 IIV4 VACC NO PRSV 0.5 ML IM: CPT | Performed by: FAMILY MEDICINE

## 2019-02-21 ENCOUNTER — APPOINTMENT (OUTPATIENT)
Dept: LAB | Age: 64
End: 2019-02-21
Attending: FAMILY MEDICINE
Payer: COMMERCIAL

## 2019-02-21 DIAGNOSIS — E11.9 TYPE 2 DIABETES MELLITUS WITHOUT COMPLICATION, WITHOUT LONG-TERM CURRENT USE OF INSULIN (HCC): ICD-10-CM

## 2019-02-21 DIAGNOSIS — Z11.59 ENCOUNTER FOR HEPATITIS C SCREENING TEST FOR LOW RISK PATIENT: ICD-10-CM

## 2019-02-21 DIAGNOSIS — E03.9 ACQUIRED HYPOTHYROIDISM: ICD-10-CM

## 2019-02-21 DIAGNOSIS — Z00.00 LABORATORY EXAMINATION ORDERED AS PART OF A ROUTINE GENERAL MEDICAL EXAMINATION: ICD-10-CM

## 2019-02-21 LAB
ALBUMIN SERPL-MCNC: 4.4 G/DL (ref 3.4–5)
ALBUMIN/GLOB SERPL: 1.6 {RATIO} (ref 1–2)
ALP LIVER SERPL-CCNC: 60 U/L (ref 50–130)
ALT SERPL-CCNC: 33 U/L (ref 13–56)
ANION GAP SERPL CALC-SCNC: 6 MMOL/L (ref 0–18)
AST SERPL-CCNC: 19 U/L (ref 15–37)
BILIRUB SERPL-MCNC: 0.7 MG/DL (ref 0.1–2)
BUN BLD-MCNC: 17 MG/DL (ref 7–18)
BUN/CREAT SERPL: 32.7 (ref 10–20)
CALCIUM BLD-MCNC: 9.1 MG/DL (ref 8.5–10.1)
CHLORIDE SERPL-SCNC: 106 MMOL/L (ref 98–107)
CHOLEST SMN-MCNC: 155 MG/DL (ref ?–200)
CO2 SERPL-SCNC: 25 MMOL/L (ref 21–32)
CREAT BLD-MCNC: 0.52 MG/DL (ref 0.55–1.02)
CREAT UR-SCNC: 70.3 MG/DL
EST. AVERAGE GLUCOSE BLD GHB EST-MCNC: 163 MG/DL (ref 68–126)
GLOBULIN PLAS-MCNC: 2.8 G/DL (ref 2.8–4.4)
GLUCOSE BLD-MCNC: 119 MG/DL (ref 70–99)
HBA1C MFR BLD HPLC: 7.3 % (ref ?–5.7)
HCV AB SERPL QL IA: NONREACTIVE
HDLC SERPL-MCNC: 70 MG/DL (ref 40–59)
LDLC SERPL CALC-MCNC: 63 MG/DL (ref ?–100)
M PROTEIN MFR SERPL ELPH: 7.2 G/DL (ref 6.4–8.2)
MICROALBUMIN UR-MCNC: 0.74 MG/DL
MICROALBUMIN/CREAT 24H UR-RTO: 10.5 UG/MG (ref ?–30)
NONHDLC SERPL-MCNC: 85 MG/DL (ref ?–130)
OSMOLALITY SERPL CALC.SUM OF ELEC: 287 MOSM/KG (ref 275–295)
POTASSIUM SERPL-SCNC: 4.2 MMOL/L (ref 3.5–5.1)
SODIUM SERPL-SCNC: 137 MMOL/L (ref 136–145)
T4 FREE SERPL-MCNC: 1.1 NG/DL (ref 0.8–1.7)
TRIGL SERPL-MCNC: 111 MG/DL (ref 30–149)
TSI SER-ACNC: 0.49 MIU/ML (ref 0.36–3.74)
VLDLC SERPL CALC-MCNC: 22 MG/DL (ref 0–30)

## 2019-02-21 PROCEDURE — 82043 UR ALBUMIN QUANTITATIVE: CPT

## 2019-02-21 PROCEDURE — 84439 ASSAY OF FREE THYROXINE: CPT

## 2019-02-21 PROCEDURE — 80053 COMPREHEN METABOLIC PANEL: CPT

## 2019-02-21 PROCEDURE — 86803 HEPATITIS C AB TEST: CPT

## 2019-02-21 PROCEDURE — 82570 ASSAY OF URINE CREATININE: CPT

## 2019-02-21 PROCEDURE — 84443 ASSAY THYROID STIM HORMONE: CPT

## 2019-02-21 PROCEDURE — 80061 LIPID PANEL: CPT

## 2019-02-21 PROCEDURE — 83036 HEMOGLOBIN GLYCOSYLATED A1C: CPT

## 2019-02-27 ENCOUNTER — OFFICE VISIT (OUTPATIENT)
Dept: FAMILY MEDICINE CLINIC | Facility: CLINIC | Age: 64
End: 2019-02-27
Payer: COMMERCIAL

## 2019-02-27 VITALS
HEIGHT: 61 IN | HEART RATE: 72 BPM | WEIGHT: 196 LBS | TEMPERATURE: 98 F | SYSTOLIC BLOOD PRESSURE: 128 MMHG | DIASTOLIC BLOOD PRESSURE: 78 MMHG | BODY MASS INDEX: 37 KG/M2

## 2019-02-27 DIAGNOSIS — E03.9 ACQUIRED HYPOTHYROIDISM: ICD-10-CM

## 2019-02-27 DIAGNOSIS — E78.5 HYPERLIPIDEMIA WITH TARGET LDL LESS THAN 70: Chronic | ICD-10-CM

## 2019-02-27 DIAGNOSIS — I10 ESSENTIAL HYPERTENSION: Chronic | ICD-10-CM

## 2019-02-27 DIAGNOSIS — E11.9 TYPE 2 DIABETES MELLITUS WITHOUT COMPLICATION, WITHOUT LONG-TERM CURRENT USE OF INSULIN (HCC): Primary | ICD-10-CM

## 2019-02-27 PROCEDURE — 99214 OFFICE O/P EST MOD 30 MIN: CPT | Performed by: FAMILY MEDICINE

## 2019-02-27 NOTE — PROGRESS NOTES
HPI:   Patient presents with:  Diabetes: f/u    Taqueria Lin is a 66year old female who presents for recheck of her diabetes.   Subjective    Eye exam 12/10/18    Lab Results   Component Value Date    A1C 7.3 (H) 02/21/2019       Diabetes Care Dilated Eye left side. Edema not present. Pulmonary/Chest: Effort normal and breath sounds normal. No respiratory distress. She has no wheezes. Abdominal: Soft. Bowel sounds are normal. There is no tenderness.    Musculoskeletal:        Right foot: There is no de Endocrine Medications          Levothyroxine Sodium 75 MCG Oral Tab                 Type 2 diabetes mellitus ([address] - Primary    Overview     Dx 2013, Metformin 1000 daily         Current Assessment & Plan     As for her Diabetes, it is well controlled, n

## 2019-02-27 NOTE — ASSESSMENT & PLAN NOTE
Stable, Continue present management.     Thyroid  (most recent labs)   Lab Results   Component Value Date/Time    TSH 0.487 02/21/2019 09:28 AM    T4F 1.1 02/21/2019 09:28 AM    TSHT4 0.51 02/01/2014 08:58 AM         Endocrine Medications          Levothyro

## 2019-05-13 ENCOUNTER — TELEPHONE (OUTPATIENT)
Dept: FAMILY MEDICINE CLINIC | Facility: CLINIC | Age: 64
End: 2019-05-13

## 2019-05-13 ENCOUNTER — OFFICE VISIT (OUTPATIENT)
Dept: FAMILY MEDICINE CLINIC | Facility: CLINIC | Age: 64
End: 2019-05-13
Payer: COMMERCIAL

## 2019-05-13 VITALS
DIASTOLIC BLOOD PRESSURE: 80 MMHG | SYSTOLIC BLOOD PRESSURE: 128 MMHG | WEIGHT: 194 LBS | HEART RATE: 74 BPM | BODY MASS INDEX: 36.63 KG/M2 | RESPIRATION RATE: 16 BRPM | HEIGHT: 61 IN | TEMPERATURE: 99 F

## 2019-05-13 DIAGNOSIS — J06.9 ACUTE URI: Primary | ICD-10-CM

## 2019-05-13 PROCEDURE — 99213 OFFICE O/P EST LOW 20 MIN: CPT | Performed by: NURSE PRACTITIONER

## 2019-05-13 RX ORDER — GUAIFENESIN AND CODEINE PHOSPHATE 100; 10 MG/5ML; MG/5ML
5 SOLUTION ORAL NIGHTLY PRN
Qty: 118 ML | Refills: 0 | Status: SHIPPED | OUTPATIENT
Start: 2019-05-13 | End: 2019-05-27

## 2019-05-13 NOTE — PROGRESS NOTES
Patient presents with:  Cough: Cough, sinus congestion past 4days      HPI:  Presents with approx 4 day history of sore throat, sinus congestion, cough with occasional production of white sputum, nasal drainage and fatigue.  Denies fever/chills, body aches, COMPLEX 1 OR) Take by mouth daily. Disp:  Rfl:        Physical Exam  There were no vitals taken for this visit. Constitutional: well developed, well nourished,in no apparent distress  HEENT: Normocephalic and atraumatic. Tympanic membranes clear bilat.

## 2019-05-13 NOTE — TELEPHONE ENCOUNTER
[address] called to inform for the guaiFENesin-Codeine 100-10 MG/5ML Oral Syrup, they can only fill a 7 day supply for first time fill

## 2019-05-29 ENCOUNTER — OFFICE VISIT (OUTPATIENT)
Dept: FAMILY MEDICINE CLINIC | Facility: CLINIC | Age: 64
End: 2019-05-29
Payer: COMMERCIAL

## 2019-05-29 VITALS
SYSTOLIC BLOOD PRESSURE: 108 MMHG | DIASTOLIC BLOOD PRESSURE: 80 MMHG | HEART RATE: 80 BPM | TEMPERATURE: 99 F | BODY MASS INDEX: 36.75 KG/M2 | WEIGHT: 194.63 LBS | HEIGHT: 61 IN

## 2019-05-29 DIAGNOSIS — I10 ESSENTIAL HYPERTENSION: Chronic | ICD-10-CM

## 2019-05-29 DIAGNOSIS — E03.9 ACQUIRED HYPOTHYROIDISM: ICD-10-CM

## 2019-05-29 DIAGNOSIS — Z12.39 SCREENING FOR BREAST CANCER: ICD-10-CM

## 2019-05-29 DIAGNOSIS — Z12.31 VISIT FOR SCREENING MAMMOGRAM: ICD-10-CM

## 2019-05-29 DIAGNOSIS — E11.9 TYPE 2 DIABETES MELLITUS WITHOUT COMPLICATION, WITHOUT LONG-TERM CURRENT USE OF INSULIN (HCC): Primary | ICD-10-CM

## 2019-05-29 DIAGNOSIS — E66.01 SEVERE OBESITY (BMI 35.0-35.9 WITH COMORBIDITY) (HCC): ICD-10-CM

## 2019-05-29 DIAGNOSIS — E78.5 HYPERLIPIDEMIA WITH TARGET LDL LESS THAN 70: Chronic | ICD-10-CM

## 2019-05-29 PROCEDURE — 83036 HEMOGLOBIN GLYCOSYLATED A1C: CPT | Performed by: FAMILY MEDICINE

## 2019-05-29 PROCEDURE — 99214 OFFICE O/P EST MOD 30 MIN: CPT | Performed by: FAMILY MEDICINE

## 2019-05-29 RX ORDER — AZITHROMYCIN 250 MG/1
TABLET, FILM COATED ORAL
Qty: 6 TABLET | Refills: 0 | Status: SHIPPED | OUTPATIENT
Start: 2019-05-29 | End: 2019-08-21 | Stop reason: ALTCHOICE

## 2019-05-29 NOTE — PROGRESS NOTES
HPI:   Patient presents with:  Diabetes: 3 month check    Tyrone Heller is a 66year old female who presents for recheck of her diabetes. Subjective    Better A1c, btetter Therapuetic Lifestyle Change.    Lab Results   Component Value Date    A1C 6.9 (A) 0 the right side, and 2+ on the left side. Edema not present. Pulmonary/Chest: Effort normal and breath sounds normal. No respiratory distress. She has no wheezes. Abdominal: Soft. Bowel sounds are normal. There is no tenderness.    Musculoskeletal: Overview     Lisinopril 5- not really BP issue, but DM            Endocrine    Acquired hypothyroidism    Overview     Levothyroxine 75 (up from 50 8/17/2016)         Relevant Orders    TSH+FREE T4    Type 2 diabetes mellitus ([facility] - Primary    Overview

## 2019-08-15 ENCOUNTER — APPOINTMENT (OUTPATIENT)
Dept: LAB | Age: 64
End: 2019-08-15
Attending: FAMILY MEDICINE
Payer: COMMERCIAL

## 2019-08-15 ENCOUNTER — HOSPITAL ENCOUNTER (OUTPATIENT)
Dept: MAMMOGRAPHY | Age: 64
Discharge: HOME | End: 2019-08-15
Attending: FAMILY MEDICINE
Payer: COMMERCIAL

## 2019-08-15 DIAGNOSIS — Z12.31 VISIT FOR SCREENING MAMMOGRAM: ICD-10-CM

## 2019-08-15 DIAGNOSIS — E03.9 ACQUIRED HYPOTHYROIDISM: ICD-10-CM

## 2019-08-15 DIAGNOSIS — E11.9 TYPE 2 DIABETES MELLITUS WITHOUT COMPLICATION, WITHOUT LONG-TERM CURRENT USE OF INSULIN (HCC): ICD-10-CM

## 2019-08-15 LAB
ALBUMIN SERPL-MCNC: 4.1 G/DL (ref 3.4–5)
ALBUMIN/GLOB SERPL: 1.4 {RATIO} (ref 1–2)
ALP LIVER SERPL-CCNC: 65 U/L (ref 50–130)
ALT SERPL-CCNC: 38 U/L (ref 13–56)
ANION GAP SERPL CALC-SCNC: 8 MMOL/L (ref 0–18)
AST SERPL-CCNC: 22 U/L (ref 15–37)
BILIRUB SERPL-MCNC: 0.9 MG/DL (ref 0.1–2)
BUN BLD-MCNC: 14 MG/DL (ref 7–18)
BUN/CREAT SERPL: 20.3 (ref 10–20)
CALCIUM BLD-MCNC: 9.1 MG/DL (ref 8.5–10.1)
CHLORIDE SERPL-SCNC: 105 MMOL/L (ref 98–112)
CHOLEST SMN-MCNC: 169 MG/DL (ref ?–200)
CO2 SERPL-SCNC: 25 MMOL/L (ref 21–32)
CREAT BLD-MCNC: 0.69 MG/DL (ref 0.55–1.02)
EST. AVERAGE GLUCOSE BLD GHB EST-MCNC: 166 MG/DL (ref 68–126)
GLOBULIN PLAS-MCNC: 2.9 G/DL (ref 2.8–4.4)
GLUCOSE BLD-MCNC: 134 MG/DL (ref 70–99)
HBA1C MFR BLD HPLC: 7.4 % (ref ?–5.7)
HDLC SERPL-MCNC: 63 MG/DL (ref 40–59)
LDLC SERPL CALC-MCNC: 75 MG/DL (ref ?–100)
M PROTEIN MFR SERPL ELPH: 7 G/DL (ref 6.4–8.2)
NONHDLC SERPL-MCNC: 106 MG/DL (ref ?–130)
OSMOLALITY SERPL CALC.SUM OF ELEC: 288 MOSM/KG (ref 275–295)
POTASSIUM SERPL-SCNC: 4 MMOL/L (ref 3.5–5.1)
SODIUM SERPL-SCNC: 138 MMOL/L (ref 136–145)
T4 FREE SERPL-MCNC: 0.9 NG/DL (ref 0.8–1.7)
TRIGL SERPL-MCNC: 155 MG/DL (ref 30–149)
TSI SER-ACNC: 0.69 MIU/ML (ref 0.36–3.74)
VLDLC SERPL CALC-MCNC: 31 MG/DL (ref 0–30)

## 2019-08-15 PROCEDURE — 84439 ASSAY OF FREE THYROXINE: CPT

## 2019-08-15 PROCEDURE — 80053 COMPREHEN METABOLIC PANEL: CPT

## 2019-08-15 PROCEDURE — 84443 ASSAY THYROID STIM HORMONE: CPT

## 2019-08-15 PROCEDURE — 83036 HEMOGLOBIN GLYCOSYLATED A1C: CPT

## 2019-08-15 PROCEDURE — 80061 LIPID PANEL: CPT

## 2019-08-15 PROCEDURE — 77067 SCR MAMMO BI INCL CAD: CPT | Performed by: FAMILY MEDICINE

## 2019-08-21 ENCOUNTER — OFFICE VISIT (OUTPATIENT)
Dept: FAMILY MEDICINE CLINIC | Facility: CLINIC | Age: 64
End: 2019-08-21
Payer: COMMERCIAL

## 2019-08-21 VITALS
HEIGHT: 61 IN | HEART RATE: 72 BPM | SYSTOLIC BLOOD PRESSURE: 122 MMHG | WEIGHT: 195.19 LBS | TEMPERATURE: 97 F | RESPIRATION RATE: 12 BRPM | BODY MASS INDEX: 36.85 KG/M2 | DIASTOLIC BLOOD PRESSURE: 76 MMHG

## 2019-08-21 DIAGNOSIS — E66.01 SEVERE OBESITY (BMI 35.0-35.9 WITH COMORBIDITY) (HCC): ICD-10-CM

## 2019-08-21 DIAGNOSIS — E78.2 MIXED HYPERLIPIDEMIA: ICD-10-CM

## 2019-08-21 DIAGNOSIS — E03.9 ACQUIRED HYPOTHYROIDISM: ICD-10-CM

## 2019-08-21 DIAGNOSIS — E11.9 TYPE 2 DIABETES MELLITUS WITHOUT COMPLICATION, WITHOUT LONG-TERM CURRENT USE OF INSULIN (HCC): Primary | ICD-10-CM

## 2019-08-21 DIAGNOSIS — G35 MULTIPLE SCLEROSIS (HCC): Chronic | ICD-10-CM

## 2019-08-21 DIAGNOSIS — I10 ESSENTIAL HYPERTENSION: Chronic | ICD-10-CM

## 2019-08-21 PROCEDURE — 99396 PREV VISIT EST AGE 40-64: CPT | Performed by: FAMILY MEDICINE

## 2019-08-21 RX ORDER — ATORVASTATIN CALCIUM 10 MG/1
10 TABLET, FILM COATED ORAL DAILY
Qty: 90 TABLET | Refills: 3 | Status: SHIPPED | OUTPATIENT
Start: 2019-08-21 | End: 2020-07-29

## 2019-08-21 RX ORDER — METFORMIN HYDROCHLORIDE 500 MG/1
1000 TABLET, EXTENDED RELEASE ORAL
Qty: 180 TABLET | Refills: 3 | Status: SHIPPED | OUTPATIENT
Start: 2019-08-21 | End: 2019-11-06

## 2019-08-21 RX ORDER — BLOOD SUGAR DIAGNOSTIC
STRIP MISCELLANEOUS
Qty: 100 STRIP | Refills: 3 | Status: SHIPPED | OUTPATIENT
Start: 2019-08-21 | End: 2021-12-11

## 2019-08-21 RX ORDER — LISINOPRIL 5 MG/1
5 TABLET ORAL DAILY
Qty: 90 TABLET | Refills: 3 | Status: SHIPPED | OUTPATIENT
Start: 2019-08-21 | End: 2020-07-29

## 2019-08-21 RX ORDER — LEVOTHYROXINE SODIUM 0.07 MG/1
75 TABLET ORAL DAILY
Qty: 90 TABLET | Refills: 3 | Status: SHIPPED | OUTPATIENT
Start: 2019-08-21 | End: 2020-07-29

## 2019-08-21 NOTE — PROGRESS NOTES
Ana Bates is a 60year old female who presents for a complete physical exam.   HPI:   Patient presents with:  Physical     Her last annual assessment has been over 1 year: Annual Physical due on 02/14/2019          Symptoms: is menopausal. Patient comp AM    TSH 0.687 08/15/2019 09:59 AM    T4F 0.9 08/15/2019 09:59 AM        Lab Results   Component Value Date/Time    CHOLEST 169 08/15/2019 09:59 AM    HDL 63 (H) 08/15/2019 09:59 AM    TRIG 155 (H) 08/15/2019 09:59 AM    LDL 75 08/15/2019 09:59 AM    [address] her daughter; Hypertension in her son; Other in her father and mother; Ovarian Cancer (age of onset: 22) in her maternal cousin female; Stroke in her son; Thyroid Disorder in her daughter and sister.      She has a current medication list which includes the Ear: Tympanic membrane, external ear and ear canal normal.   Left Ear: Tympanic membrane, external ear and ear canal normal.   Nose: Nose normal.   Mouth/Throat: Uvula is midline, oropharynx is clear and moist and mucous membranes are normal. No oropharyng maintenance, UTD   Immunizations: needs pap with Gyne.  UTD  Immunization History   Administered Date(s) Administered   • >=3 YRS TRI  MULTIDOSE VIAL (32030) FLU CLINIC 11/13/2014   • >=9 YRS AFLURIA TRI PRESERV FREE SINGLE DOSE (07767) FLU CLINIC 10/14/201 stable. Thyroid stable with labs due soon, relatively well controlled diabetes, continue to work on weight loss and therapeutic lifestyle change and   Return in about 3 months (around 11/21/2019) for diabetes follow up.     Mitzi Solorzano MD, 8/21/2019, 9:04

## 2019-09-11 DIAGNOSIS — E11.9 TYPE 2 DIABETES MELLITUS WITHOUT COMPLICATION, WITHOUT LONG-TERM CURRENT USE OF INSULIN (HCC): ICD-10-CM

## 2019-09-11 RX ORDER — LANCETS
EACH MISCELLANEOUS
Qty: 102 EACH | Refills: 1 | Status: SHIPPED | OUTPATIENT
Start: 2019-09-11 | End: 2020-11-09

## 2019-09-11 NOTE — TELEPHONE ENCOUNTER
Requested Prescriptions     Pending Prescriptions Disp Refills   • ACCU-CHEK FASTCLIX LANCETS Does not apply Misc [Pharmacy Med Name: FASTCLIX LANCETS    MIS]  1     Sig: USE ONE LANCET ONCE DAILY AS DIRECTED     LOV 8/21/2019     Patient was asked to foll

## 2019-11-06 ENCOUNTER — OFFICE VISIT (OUTPATIENT)
Dept: FAMILY MEDICINE CLINIC | Facility: CLINIC | Age: 64
End: 2019-11-06
Payer: COMMERCIAL

## 2019-11-06 VITALS
BODY MASS INDEX: 36.44 KG/M2 | HEART RATE: 72 BPM | HEIGHT: 61 IN | DIASTOLIC BLOOD PRESSURE: 82 MMHG | SYSTOLIC BLOOD PRESSURE: 130 MMHG | TEMPERATURE: 98 F | RESPIRATION RATE: 12 BRPM | WEIGHT: 193 LBS

## 2019-11-06 DIAGNOSIS — E11.9 TYPE 2 DIABETES MELLITUS WITHOUT COMPLICATION, WITHOUT LONG-TERM CURRENT USE OF INSULIN (HCC): Primary | ICD-10-CM

## 2019-11-06 DIAGNOSIS — E78.2 MIXED HYPERLIPIDEMIA: ICD-10-CM

## 2019-11-06 DIAGNOSIS — I10 ESSENTIAL HYPERTENSION: Chronic | ICD-10-CM

## 2019-11-06 PROCEDURE — 90686 IIV4 VACC NO PRSV 0.5 ML IM: CPT | Performed by: FAMILY MEDICINE

## 2019-11-06 PROCEDURE — 99214 OFFICE O/P EST MOD 30 MIN: CPT | Performed by: FAMILY MEDICINE

## 2019-11-06 PROCEDURE — 83036 HEMOGLOBIN GLYCOSYLATED A1C: CPT | Performed by: FAMILY MEDICINE

## 2019-11-06 PROCEDURE — 90471 IMMUNIZATION ADMIN: CPT | Performed by: FAMILY MEDICINE

## 2019-11-06 RX ORDER — METFORMIN HYDROCHLORIDE 500 MG/1
1500 TABLET, EXTENDED RELEASE ORAL
Qty: 270 TABLET | Refills: 3 | Status: SHIPPED | OUTPATIENT
Start: 2019-11-06 | End: 2020-02-19

## 2019-11-06 NOTE — ASSESSMENT & PLAN NOTE
Stable, Continue present management.     Thyroid  (most recent labs)   Lab Results   Component Value Date/Time    TSH 0.687 08/15/2019 09:59 AM    T4F 0.9 08/15/2019 09:59 AM    TSHT4 0.51 02/01/2014 08:58 AM         Endocrine Medications          Levothyro

## 2019-11-06 NOTE — PROGRESS NOTES
HPI:   Patient presents with:  Diabetes    Urbano Soares is a 60year old female who presents for recheck of her diabetes.   Subjective    FBS fluctuating 120 to 150  Lab Results   Component Value Date    A1C 7.4 (H) 08/15/2019       Influenza Vaccine(1) du right side and 2+ on the left side. Posterior tibial pulses are 2+ on the right side and 2+ on the left side. Edema not present. Pulmonary/Chest: Effort normal and breath sounds normal. No respiratory distress. She has no wheezes.    Abdominal: S Other Relevant Orders    HEMOGLOBIN A1C (Completed)    COMP METABOLIC PANEL (14)    LIPID PANEL    HEMOGLOBIN A1C    MICROALB/CREAT RATIO, RANDOM URINE    TSH+FREE T4      She notes an irritation to her right big toe medial side.   Normal neurovascular find

## 2020-02-12 ENCOUNTER — APPOINTMENT (OUTPATIENT)
Dept: LAB | Age: 65
End: 2020-02-12
Attending: FAMILY MEDICINE
Payer: COMMERCIAL

## 2020-02-12 DIAGNOSIS — E11.9 TYPE 2 DIABETES MELLITUS WITHOUT COMPLICATION, WITHOUT LONG-TERM CURRENT USE OF INSULIN (HCC): ICD-10-CM

## 2020-02-12 LAB
ALBUMIN SERPL-MCNC: 4.1 G/DL (ref 3.4–5)
ALBUMIN/GLOB SERPL: 1.4 {RATIO} (ref 1–2)
ALP LIVER SERPL-CCNC: 50 U/L (ref 50–130)
ALT SERPL-CCNC: 38 U/L (ref 13–56)
ANION GAP SERPL CALC-SCNC: 5 MMOL/L (ref 0–18)
AST SERPL-CCNC: 24 U/L (ref 15–37)
BILIRUB SERPL-MCNC: 0.8 MG/DL (ref 0.1–2)
BUN BLD-MCNC: 15 MG/DL (ref 7–18)
BUN/CREAT SERPL: 23.4 (ref 10–20)
CALCIUM BLD-MCNC: 8.6 MG/DL (ref 8.5–10.1)
CHLORIDE SERPL-SCNC: 107 MMOL/L (ref 98–112)
CHOLEST SMN-MCNC: 145 MG/DL (ref ?–200)
CO2 SERPL-SCNC: 26 MMOL/L (ref 21–32)
CREAT BLD-MCNC: 0.64 MG/DL (ref 0.55–1.02)
CREAT UR-SCNC: 71.7 MG/DL
EST. AVERAGE GLUCOSE BLD GHB EST-MCNC: 166 MG/DL (ref 68–126)
GLOBULIN PLAS-MCNC: 2.9 G/DL (ref 2.8–4.4)
GLUCOSE BLD-MCNC: 114 MG/DL (ref 70–99)
HBA1C MFR BLD HPLC: 7.4 % (ref ?–5.7)
HDLC SERPL-MCNC: 65 MG/DL (ref 40–59)
LDLC SERPL CALC-MCNC: 55 MG/DL (ref ?–100)
M PROTEIN MFR SERPL ELPH: 7 G/DL (ref 6.4–8.2)
MICROALBUMIN UR-MCNC: 0.62 MG/DL
MICROALBUMIN/CREAT 24H UR-RTO: 8.6 UG/MG (ref ?–30)
NONHDLC SERPL-MCNC: 80 MG/DL (ref ?–130)
OSMOLALITY SERPL CALC.SUM OF ELEC: 288 MOSM/KG (ref 275–295)
PATIENT FASTING Y/N/NP: YES
PATIENT FASTING Y/N/NP: YES
POTASSIUM SERPL-SCNC: 3.8 MMOL/L (ref 3.5–5.1)
SODIUM SERPL-SCNC: 138 MMOL/L (ref 136–145)
T4 FREE SERPL-MCNC: 1.1 NG/DL (ref 0.8–1.7)
TRIGL SERPL-MCNC: 126 MG/DL (ref 30–149)
TSI SER-ACNC: 0.32 MIU/ML (ref 0.36–3.74)
VLDLC SERPL CALC-MCNC: 25 MG/DL (ref 0–30)

## 2020-02-12 PROCEDURE — 84439 ASSAY OF FREE THYROXINE: CPT

## 2020-02-12 PROCEDURE — 80061 LIPID PANEL: CPT

## 2020-02-12 PROCEDURE — 83036 HEMOGLOBIN GLYCOSYLATED A1C: CPT

## 2020-02-12 PROCEDURE — 80053 COMPREHEN METABOLIC PANEL: CPT

## 2020-02-12 PROCEDURE — 82043 UR ALBUMIN QUANTITATIVE: CPT

## 2020-02-12 PROCEDURE — 82570 ASSAY OF URINE CREATININE: CPT

## 2020-02-12 PROCEDURE — 84443 ASSAY THYROID STIM HORMONE: CPT

## 2020-02-19 ENCOUNTER — OFFICE VISIT (OUTPATIENT)
Dept: FAMILY MEDICINE CLINIC | Facility: CLINIC | Age: 65
End: 2020-02-19
Payer: COMMERCIAL

## 2020-02-19 VITALS
HEIGHT: 61 IN | SYSTOLIC BLOOD PRESSURE: 122 MMHG | RESPIRATION RATE: 16 BRPM | TEMPERATURE: 98 F | WEIGHT: 193 LBS | BODY MASS INDEX: 36.44 KG/M2 | DIASTOLIC BLOOD PRESSURE: 74 MMHG | HEART RATE: 62 BPM

## 2020-02-19 DIAGNOSIS — E11.9 TYPE 2 DIABETES MELLITUS WITHOUT COMPLICATION, WITHOUT LONG-TERM CURRENT USE OF INSULIN (HCC): Primary | ICD-10-CM

## 2020-02-19 DIAGNOSIS — E11.65 TYPE 2 DIABETES MELLITUS WITH HYPERGLYCEMIA, WITHOUT LONG-TERM CURRENT USE OF INSULIN (HCC): ICD-10-CM

## 2020-02-19 DIAGNOSIS — E03.9 ACQUIRED HYPOTHYROIDISM: ICD-10-CM

## 2020-02-19 DIAGNOSIS — G35 MULTIPLE SCLEROSIS (HCC): Chronic | ICD-10-CM

## 2020-02-19 DIAGNOSIS — E78.2 MIXED HYPERLIPIDEMIA: ICD-10-CM

## 2020-02-19 DIAGNOSIS — I10 ESSENTIAL HYPERTENSION: Chronic | ICD-10-CM

## 2020-02-19 DIAGNOSIS — E66.01 SEVERE OBESITY (BMI 35.0-35.9 WITH COMORBIDITY) (HCC): ICD-10-CM

## 2020-02-19 PROCEDURE — 99214 OFFICE O/P EST MOD 30 MIN: CPT | Performed by: FAMILY MEDICINE

## 2020-02-19 RX ORDER — METFORMIN HYDROCHLORIDE 500 MG/1
1000 TABLET, EXTENDED RELEASE ORAL 2 TIMES DAILY WITH MEALS
Qty: 360 TABLET | Refills: 3 | Status: SHIPPED | OUTPATIENT
Start: 2020-02-19 | End: 2020-07-29

## 2020-02-19 NOTE — PROGRESS NOTES
HPI:   Patient presents with:  Diabetes    Ana Bates is a 60year old female who presents for recheck of her diabetes. Subjective    Lots of stress at home, unchnaged A1c but better lipids. Blurred vistion last week, to get in with ophto next week. 2+ on the right side and 2+ on the left side. Posterior tibial pulses are 2+ on the right side and 2+ on the left side. Edema not present. Pulmonary/Chest: Effort normal and breath sounds normal. No respiratory distress. She has no wheezes.    Ab 09:54 AM    T4F 1.1 02/12/2020 09:54 AM    TSHT4 0.51 02/01/2014 08:58 AM         Endocrine Medications          Levothyroxine Sodium 75 MCG Oral Tab                 Type 2 diabetes mellitus with hyperglycemia, without long-term current use of insulin ([facility] [address]

## 2020-02-20 NOTE — ASSESSMENT & PLAN NOTE
Stable, Continue present management.     Thyroid  (most recent labs)   Lab Results   Component Value Date/Time    TSH 0.316 (L) 02/12/2020 09:54 AM    T4F 1.1 02/12/2020 09:54 AM    TSHT4 0.51 02/01/2014 08:58 AM         Endocrine Medications          Levot

## 2020-03-04 ENCOUNTER — DIABETIC EDUCATION (OUTPATIENT)
Dept: ENDOCRINOLOGY CLINIC | Facility: CLINIC | Age: 65
End: 2020-03-04
Payer: COMMERCIAL

## 2020-03-04 DIAGNOSIS — E11.65 TYPE 2 DIABETES MELLITUS WITH HYPERGLYCEMIA, WITHOUT LONG-TERM CURRENT USE OF INSULIN (HCC): Primary | ICD-10-CM

## 2020-03-04 PROCEDURE — 97802 MEDICAL NUTRITION INDIV IN: CPT | Performed by: DIETITIAN, REGISTERED

## 2020-03-04 NOTE — PROGRESS NOTES
Ilachandler Carson   6/5/1955 was seen for Diabetic Medical Nutrition Therapy:    3/4/2020  Referring Provider: Dr Uma Rivera  Start time: 10:00 End time: 11:00    Pt with MS and is just starting to increase her walking.   She keeps track of her calories on [employer] reduced saturated and trans fat, reduced sodium and high fiber. Exercise: to increase  Discussed benefits of regular physical activity. Patient set nutrition goals: keep tracking calories, test BG's after meals and increase activity.      Patient is w

## 2020-07-24 ENCOUNTER — APPOINTMENT (OUTPATIENT)
Dept: LAB | Age: 65
End: 2020-07-24
Attending: FAMILY MEDICINE
Payer: COMMERCIAL

## 2020-07-24 PROCEDURE — 36415 COLL VENOUS BLD VENIPUNCTURE: CPT | Performed by: FAMILY MEDICINE

## 2020-07-24 PROCEDURE — 83036 HEMOGLOBIN GLYCOSYLATED A1C: CPT | Performed by: FAMILY MEDICINE

## 2020-07-24 PROCEDURE — 80053 COMPREHEN METABOLIC PANEL: CPT | Performed by: FAMILY MEDICINE

## 2020-07-24 PROCEDURE — 85025 COMPLETE CBC W/AUTO DIFF WBC: CPT | Performed by: FAMILY MEDICINE

## 2020-07-24 PROCEDURE — 84439 ASSAY OF FREE THYROXINE: CPT | Performed by: FAMILY MEDICINE

## 2020-07-24 PROCEDURE — 84443 ASSAY THYROID STIM HORMONE: CPT | Performed by: FAMILY MEDICINE

## 2020-07-28 NOTE — ASSESSMENT & PLAN NOTE
Stable, Continue present management.     Thyroid  (most recent labs)   Lab Results   Component Value Date/Time    TSH 0.196 (L) 07/24/2020 08:20 AM    T4F 1.2 07/24/2020 08:20 AM    TSHT4 0.51 02/01/2014 08:58 AM         Endocrine Medications          Levot

## 2020-07-29 ENCOUNTER — OFFICE VISIT (OUTPATIENT)
Dept: FAMILY MEDICINE CLINIC | Facility: CLINIC | Age: 65
End: 2020-07-29
Payer: COMMERCIAL

## 2020-07-29 VITALS
WEIGHT: 185 LBS | BODY MASS INDEX: 34.93 KG/M2 | RESPIRATION RATE: 16 BRPM | TEMPERATURE: 98 F | DIASTOLIC BLOOD PRESSURE: 74 MMHG | HEIGHT: 61 IN | SYSTOLIC BLOOD PRESSURE: 122 MMHG | HEART RATE: 70 BPM

## 2020-07-29 DIAGNOSIS — E66.01 SEVERE OBESITY (BMI 35.0-35.9 WITH COMORBIDITY) (HCC): ICD-10-CM

## 2020-07-29 DIAGNOSIS — E78.2 MIXED HYPERLIPIDEMIA: ICD-10-CM

## 2020-07-29 DIAGNOSIS — E03.9 ACQUIRED HYPOTHYROIDISM: ICD-10-CM

## 2020-07-29 DIAGNOSIS — I10 ESSENTIAL HYPERTENSION: Chronic | ICD-10-CM

## 2020-07-29 DIAGNOSIS — E11.65 TYPE 2 DIABETES MELLITUS WITH HYPERGLYCEMIA, WITHOUT LONG-TERM CURRENT USE OF INSULIN (HCC): Primary | ICD-10-CM

## 2020-07-29 PROCEDURE — 99214 OFFICE O/P EST MOD 30 MIN: CPT | Performed by: FAMILY MEDICINE

## 2020-07-29 PROCEDURE — 3008F BODY MASS INDEX DOCD: CPT | Performed by: FAMILY MEDICINE

## 2020-07-29 PROCEDURE — 3078F DIAST BP <80 MM HG: CPT | Performed by: FAMILY MEDICINE

## 2020-07-29 PROCEDURE — 3074F SYST BP LT 130 MM HG: CPT | Performed by: FAMILY MEDICINE

## 2020-07-29 RX ORDER — ATORVASTATIN CALCIUM 10 MG/1
10 TABLET, FILM COATED ORAL DAILY
Qty: 90 TABLET | Refills: 3 | Status: SHIPPED | OUTPATIENT
Start: 2020-07-29 | End: 2021-09-15

## 2020-07-29 RX ORDER — LEVOTHYROXINE SODIUM 0.07 MG/1
75 TABLET ORAL DAILY
Qty: 90 TABLET | Refills: 3 | Status: SHIPPED | OUTPATIENT
Start: 2020-07-29 | End: 2021-06-02

## 2020-07-29 RX ORDER — LISINOPRIL 5 MG/1
5 TABLET ORAL DAILY
Qty: 90 TABLET | Refills: 3 | Status: SHIPPED | OUTPATIENT
Start: 2020-07-29 | End: 2021-09-15

## 2020-07-29 RX ORDER — METFORMIN HYDROCHLORIDE 500 MG/1
1500 TABLET, EXTENDED RELEASE ORAL
Qty: 270 TABLET | Refills: 3 | Status: SHIPPED | OUTPATIENT
Start: 2020-07-29 | End: 2021-09-15

## 2020-07-29 NOTE — PROGRESS NOTES
HPI:   Patient presents with:  Diabetes: 3 month Follow Up    Boyd Negrete is a 63year old female who presents for recheck of her diabetes. Subjective    Doing well increasing activity. Weight down 8 lb. Last A1c value was 6.5% done 7/24/2020.      D right side and 2+ on the left side. Posterior tibial pulses are 2+ on the right side and 2+ on the left side. Edema not present. Pulmonary/Chest: Effort normal and breath sounds normal. No respiratory distress. She has no wheezes.    Abdominal: S Thyroid  (most recent labs)   Lab Results   Component Value Date/Time    TSH 0.196 (L) 07/24/2020 08:20 AM    T4F 1.2 07/24/2020 08:20 AM    TSHT4 0.51 02/01/2014 08:58 AM         Endocrine Medications          Levothyroxine Sodium 75 MCG Oral Tab

## 2020-10-21 ENCOUNTER — HOSPITAL ENCOUNTER (OUTPATIENT)
Dept: MAMMOGRAPHY | Age: 65
Discharge: HOME | End: 2020-10-21
Attending: OBSTETRICS & GYNECOLOGY
Payer: COMMERCIAL

## 2020-10-21 DIAGNOSIS — Z12.31 ENCOUNTER FOR SCREENING MAMMOGRAM FOR MALIGNANT NEOPLASM OF BREAST: ICD-10-CM

## 2020-10-21 PROCEDURE — 77063 BREAST TOMOSYNTHESIS BI: CPT | Performed by: OBSTETRICS & GYNECOLOGY

## 2020-10-21 PROCEDURE — 77067 SCR MAMMO BI INCL CAD: CPT | Performed by: OBSTETRICS & GYNECOLOGY

## 2020-11-08 DIAGNOSIS — E11.9 TYPE 2 DIABETES MELLITUS WITHOUT COMPLICATION, WITHOUT LONG-TERM CURRENT USE OF INSULIN (HCC): ICD-10-CM

## 2020-11-09 RX ORDER — LANCETS
EACH MISCELLANEOUS
Qty: 102 EACH | Refills: 5 | Status: SHIPPED | OUTPATIENT
Start: 2020-11-09

## 2021-02-03 ENCOUNTER — LAB ENCOUNTER (OUTPATIENT)
Dept: LAB | Age: 66
End: 2021-02-03
Attending: FAMILY MEDICINE
Payer: COMMERCIAL

## 2021-02-03 DIAGNOSIS — E11.65 TYPE 2 DIABETES MELLITUS WITH HYPERGLYCEMIA, WITHOUT LONG-TERM CURRENT USE OF INSULIN (HCC): ICD-10-CM

## 2021-02-03 DIAGNOSIS — E03.9 ACQUIRED HYPOTHYROIDISM: ICD-10-CM

## 2021-02-03 LAB
ALBUMIN SERPL-MCNC: 4.3 G/DL (ref 3.4–5)
ALBUMIN/GLOB SERPL: 1.5 {RATIO} (ref 1–2)
ALP LIVER SERPL-CCNC: 54 U/L
ALT SERPL-CCNC: 40 U/L
ANION GAP SERPL CALC-SCNC: 6 MMOL/L (ref 0–18)
AST SERPL-CCNC: 24 U/L (ref 15–37)
BILIRUB SERPL-MCNC: 0.7 MG/DL (ref 0.1–2)
BUN BLD-MCNC: 14 MG/DL (ref 7–18)
BUN/CREAT SERPL: 21.2 (ref 10–20)
CALCIUM BLD-MCNC: 9.2 MG/DL (ref 8.5–10.1)
CHLORIDE SERPL-SCNC: 108 MMOL/L (ref 98–112)
CHOLEST SMN-MCNC: 155 MG/DL (ref ?–200)
CO2 SERPL-SCNC: 27 MMOL/L (ref 21–32)
CREAT BLD-MCNC: 0.66 MG/DL
CREAT UR-SCNC: 71.9 MG/DL
EST. AVERAGE GLUCOSE BLD GHB EST-MCNC: 148 MG/DL (ref 68–126)
GLOBULIN PLAS-MCNC: 2.8 G/DL (ref 2.8–4.4)
GLUCOSE BLD-MCNC: 117 MG/DL (ref 70–99)
HBA1C MFR BLD HPLC: 6.8 % (ref ?–5.7)
HDLC SERPL-MCNC: 74 MG/DL (ref 40–59)
LDLC SERPL CALC-MCNC: 60 MG/DL (ref ?–100)
M PROTEIN MFR SERPL ELPH: 7.1 G/DL (ref 6.4–8.2)
MICROALBUMIN UR-MCNC: <0.5 MG/DL
NONHDLC SERPL-MCNC: 81 MG/DL (ref ?–130)
OSMOLALITY SERPL CALC.SUM OF ELEC: 294 MOSM/KG (ref 275–295)
PATIENT FASTING Y/N/NP: YES
PATIENT FASTING Y/N/NP: YES
POTASSIUM SERPL-SCNC: 3.7 MMOL/L (ref 3.5–5.1)
SODIUM SERPL-SCNC: 141 MMOL/L (ref 136–145)
T4 FREE SERPL-MCNC: 1.1 NG/DL (ref 0.8–1.7)
TRIGL SERPL-MCNC: 106 MG/DL (ref 30–149)
TSI SER-ACNC: 0.98 MIU/ML (ref 0.36–3.74)
VLDLC SERPL CALC-MCNC: 21 MG/DL (ref 0–30)

## 2021-02-03 PROCEDURE — 84443 ASSAY THYROID STIM HORMONE: CPT

## 2021-02-03 PROCEDURE — 84439 ASSAY OF FREE THYROXINE: CPT

## 2021-02-03 PROCEDURE — 80053 COMPREHEN METABOLIC PANEL: CPT

## 2021-02-03 PROCEDURE — 82570 ASSAY OF URINE CREATININE: CPT

## 2021-02-03 PROCEDURE — 82043 UR ALBUMIN QUANTITATIVE: CPT

## 2021-02-03 PROCEDURE — 80061 LIPID PANEL: CPT

## 2021-02-03 PROCEDURE — 83036 HEMOGLOBIN GLYCOSYLATED A1C: CPT

## 2021-02-03 PROCEDURE — 3061F NEG MICROALBUMINURIA REV: CPT | Performed by: FAMILY MEDICINE

## 2021-02-03 PROCEDURE — 36415 COLL VENOUS BLD VENIPUNCTURE: CPT

## 2021-03-18 ENCOUNTER — OFFICE VISIT (OUTPATIENT)
Dept: FAMILY MEDICINE CLINIC | Facility: CLINIC | Age: 66
End: 2021-03-18
Payer: COMMERCIAL

## 2021-03-18 VITALS
RESPIRATION RATE: 17 BRPM | DIASTOLIC BLOOD PRESSURE: 80 MMHG | TEMPERATURE: 98 F | SYSTOLIC BLOOD PRESSURE: 126 MMHG | WEIGHT: 192.63 LBS | HEIGHT: 60 IN | HEART RATE: 71 BPM | BODY MASS INDEX: 37.82 KG/M2

## 2021-03-18 DIAGNOSIS — E78.2 MIXED HYPERLIPIDEMIA: ICD-10-CM

## 2021-03-18 DIAGNOSIS — E11.65 TYPE 2 DIABETES MELLITUS WITH HYPERGLYCEMIA, WITHOUT LONG-TERM CURRENT USE OF INSULIN (HCC): ICD-10-CM

## 2021-03-18 DIAGNOSIS — E66.01 SEVERE OBESITY (BMI 35.0-35.9 WITH COMORBIDITY) (HCC): ICD-10-CM

## 2021-03-18 DIAGNOSIS — G35 MULTIPLE SCLEROSIS (HCC): Chronic | ICD-10-CM

## 2021-03-18 DIAGNOSIS — IMO0002 OSTEOARTHROSIS INVOLVING LOWER LEG: ICD-10-CM

## 2021-03-18 DIAGNOSIS — Z00.00 ANNUAL PHYSICAL EXAM: Primary | ICD-10-CM

## 2021-03-18 DIAGNOSIS — I10 ESSENTIAL HYPERTENSION: Chronic | ICD-10-CM

## 2021-03-18 DIAGNOSIS — E03.9 ACQUIRED HYPOTHYROIDISM: ICD-10-CM

## 2021-03-18 PROCEDURE — 3079F DIAST BP 80-89 MM HG: CPT | Performed by: FAMILY MEDICINE

## 2021-03-18 PROCEDURE — 99396 PREV VISIT EST AGE 40-64: CPT | Performed by: FAMILY MEDICINE

## 2021-03-18 PROCEDURE — 3074F SYST BP LT 130 MM HG: CPT | Performed by: FAMILY MEDICINE

## 2021-03-18 PROCEDURE — 3008F BODY MASS INDEX DOCD: CPT | Performed by: FAMILY MEDICINE

## 2021-03-18 NOTE — ASSESSMENT & PLAN NOTE
Stable, Continue present management.     Thyroid  (most recent labs)   Lab Results   Component Value Date/Time    TSH 0.978 02/03/2021 09:33 AM    T4F 1.1 02/03/2021 09:33 AM    TSHT4 0.51 02/01/2014 08:58 AM         Endocrine Medications          Levothyro

## 2021-03-18 NOTE — PROGRESS NOTES
Tammy Matute is a 63year old female who presents for a complete physical exam.   Last A1c value was 6.8% done 2/3/2021.     had concerns including Physical (WWE no pap).    Annual Physical due on 08/21/2021      HPI/Subjective:    She complains of doing w 22) in her maternal cousin female; Stroke in her son; Thyroid Disorder in her daughter and sister. She  reports that she quit smoking about 39 years ago. She has never used smokeless tobacco. She reports previous alcohol use.  She reports that she does no     Tobacco Use      Smoking status: Former Smoker        Quit date: 1981        Years since quittin.3      Smokeless tobacco: Never Used      Tobacco comment: quit     Vaping Use      Vaping Use: Never used    Substance and Sexu BMI 37.61 kg/m²  Estimated body mass index is 37.61 kg/m² as calculated from the following:    Height as of this encounter: 5' (1.524 m). Weight as of this encounter: 192 lb 9.6 oz (87.4 kg).    Physical Exam     Assessment & Plan:    Urbano ford a Component Value Date/Time    TSH 0.978 02/03/2021 09:33 AM    T4F 1.1 02/03/2021 09:33 AM    TSHT4 0.51 02/01/2014 08:58 AM         Endocrine Medications          Levothyroxine Sodium 75 MCG Oral Tab                 Type 2 diabetes mellitus with hypergly

## 2021-05-07 ENCOUNTER — HOSPITAL ENCOUNTER (OUTPATIENT)
Age: 66
Discharge: HOME | End: 2021-05-07
Payer: COMMERCIAL

## 2021-05-07 VITALS
SYSTOLIC BLOOD PRESSURE: 122 MMHG | TEMPERATURE: 98 F | RESPIRATION RATE: 20 BRPM | HEART RATE: 77 BPM | OXYGEN SATURATION: 97 % | DIASTOLIC BLOOD PRESSURE: 84 MMHG

## 2021-05-07 DIAGNOSIS — L03.113 CELLULITIS OF RIGHT UPPER EXTREMITY: Primary | ICD-10-CM

## 2021-05-07 PROCEDURE — 99213 OFFICE O/P EST LOW 20 MIN: CPT | Performed by: NURSE PRACTITIONER

## 2021-05-07 RX ORDER — CEPHALEXIN 500 MG/1
500 CAPSULE ORAL 4 TIMES DAILY
Qty: 28 CAPSULE | Refills: 0 | Status: SHIPPED | OUTPATIENT
Start: 2021-05-07 | End: 2021-05-14

## 2021-05-07 NOTE — ED PROVIDER NOTES
Patient Seen in: Immediate [address] [place] [address]      History   Patient presents with: Insect Bite  Cellulitis    Stated Complaint: red/swollen bug bite    HPI/Subjective: This is a 51-year-old female with below stated medical history.   Presents to immediate ca Genitourinary: Negative for dysuria. Musculoskeletal: Negative for arthralgias and neck pain. Skin: Positive for wound. Negative for rash. Allergic/Immunologic: Negative for environmental allergies. Neurological: Negative for headaches.    Hematolog Course   Labs Reviewed - No data to display       DC home. [facility]      Vital signs stable. Patient is well-appearing and nontoxic looking. Presents to immediate care for right lower arm cellulitis from possible bug bite.   Area of erythema was marked

## 2021-05-07 NOTE — ED INITIAL ASSESSMENT (HPI)
Patient states she got several insect bites on her bilateral forearms while gardening on Wed. One of the bites on her right lower forearm has become red, swollen and warm to touch since yesterday. Did take 1 old Augmentin pill at home today.

## 2021-05-14 ENCOUNTER — OFFICE VISIT (OUTPATIENT)
Dept: FAMILY MEDICINE CLINIC | Facility: CLINIC | Age: 66
End: 2021-05-14
Payer: COMMERCIAL

## 2021-05-14 VITALS
OXYGEN SATURATION: 98 % | TEMPERATURE: 98 F | HEART RATE: 78 BPM | RESPIRATION RATE: 16 BRPM | DIASTOLIC BLOOD PRESSURE: 86 MMHG | SYSTOLIC BLOOD PRESSURE: 136 MMHG

## 2021-05-14 DIAGNOSIS — L03.114 CELLULITIS OF LEFT ARM: Primary | ICD-10-CM

## 2021-05-14 PROCEDURE — 3075F SYST BP GE 130 - 139MM HG: CPT | Performed by: FAMILY MEDICINE

## 2021-05-14 PROCEDURE — 3079F DIAST BP 80-89 MM HG: CPT | Performed by: FAMILY MEDICINE

## 2021-05-14 PROCEDURE — 99213 OFFICE O/P EST LOW 20 MIN: CPT | Performed by: FAMILY MEDICINE

## 2021-05-14 RX ORDER — CEPHALEXIN 500 MG/1
500 CAPSULE ORAL 4 TIMES DAILY
Qty: 28 CAPSULE | Refills: 0 | Status: SHIPPED | OUTPATIENT
Start: 2021-05-14 | End: 2021-05-21

## 2021-05-14 NOTE — PROGRESS NOTES
Tracy Felipe is a 63year old female coming in for had concerns including Bite Sting,Insect (f/u urgent care visit) and Diabetes (recent -154).     HPI/Subjective:   HPI yardwork and injury to right forear, . 9 days and still mildly swolle,, also some itchin

## 2021-06-02 ENCOUNTER — TELEPHONE (OUTPATIENT)
Dept: FAMILY MEDICINE CLINIC | Facility: CLINIC | Age: 66
End: 2021-06-02

## 2021-06-02 DIAGNOSIS — E03.9 ACQUIRED HYPOTHYROIDISM: ICD-10-CM

## 2021-06-02 RX ORDER — LEVOTHYROXINE SODIUM 0.07 MG/1
75 TABLET ORAL DAILY
Qty: 90 TABLET | Refills: 3 | Status: SHIPPED | OUTPATIENT
Start: 2021-06-02 | End: 2022-06-02

## 2021-09-15 ENCOUNTER — OFFICE VISIT (OUTPATIENT)
Dept: FAMILY MEDICINE CLINIC | Facility: CLINIC | Age: 66
End: 2021-09-15
Payer: COMMERCIAL

## 2021-09-15 VITALS
SYSTOLIC BLOOD PRESSURE: 114 MMHG | RESPIRATION RATE: 14 BRPM | DIASTOLIC BLOOD PRESSURE: 70 MMHG | HEIGHT: 60 IN | WEIGHT: 188.19 LBS | BODY MASS INDEX: 36.95 KG/M2 | HEART RATE: 80 BPM

## 2021-09-15 DIAGNOSIS — E03.9 ACQUIRED HYPOTHYROIDISM: ICD-10-CM

## 2021-09-15 DIAGNOSIS — G35 MULTIPLE SCLEROSIS (HCC): Chronic | ICD-10-CM

## 2021-09-15 DIAGNOSIS — I10 ESSENTIAL HYPERTENSION: Chronic | ICD-10-CM

## 2021-09-15 DIAGNOSIS — E66.01 SEVERE OBESITY (BMI 35.0-35.9 WITH COMORBIDITY) (HCC): ICD-10-CM

## 2021-09-15 DIAGNOSIS — R73.9 HYPERGLYCEMIA: ICD-10-CM

## 2021-09-15 DIAGNOSIS — E78.2 MIXED HYPERLIPIDEMIA: ICD-10-CM

## 2021-09-15 DIAGNOSIS — Z00.00 LABORATORY EXAMINATION ORDERED AS PART OF A ROUTINE GENERAL MEDICAL EXAMINATION: ICD-10-CM

## 2021-09-15 DIAGNOSIS — E11.65 TYPE 2 DIABETES MELLITUS WITH HYPERGLYCEMIA, WITHOUT LONG-TERM CURRENT USE OF INSULIN (HCC): Primary | ICD-10-CM

## 2021-09-15 DIAGNOSIS — Z12.31 VISIT FOR SCREENING MAMMOGRAM: ICD-10-CM

## 2021-09-15 LAB
CARTRIDGE LOT#: 846 NUMERIC
HEMOGLOBIN A1C: 6.8 % (ref 4.3–5.6)

## 2021-09-15 PROCEDURE — 3008F BODY MASS INDEX DOCD: CPT | Performed by: FAMILY MEDICINE

## 2021-09-15 PROCEDURE — 3074F SYST BP LT 130 MM HG: CPT | Performed by: FAMILY MEDICINE

## 2021-09-15 PROCEDURE — 3044F HG A1C LEVEL LT 7.0%: CPT | Performed by: FAMILY MEDICINE

## 2021-09-15 PROCEDURE — 3078F DIAST BP <80 MM HG: CPT | Performed by: FAMILY MEDICINE

## 2021-09-15 PROCEDURE — 99214 OFFICE O/P EST MOD 30 MIN: CPT | Performed by: FAMILY MEDICINE

## 2021-09-15 PROCEDURE — 83036 HEMOGLOBIN GLYCOSYLATED A1C: CPT | Performed by: FAMILY MEDICINE

## 2021-09-15 RX ORDER — LISINOPRIL 5 MG/1
5 TABLET ORAL DAILY
Qty: 90 TABLET | Refills: 3 | Status: SHIPPED | OUTPATIENT
Start: 2021-09-15

## 2021-09-15 RX ORDER — METFORMIN HYDROCHLORIDE 500 MG/1
1500 TABLET, EXTENDED RELEASE ORAL
Qty: 270 TABLET | Refills: 3 | Status: SHIPPED | OUTPATIENT
Start: 2021-09-15

## 2021-09-15 RX ORDER — ATORVASTATIN CALCIUM 10 MG/1
10 TABLET, FILM COATED ORAL DAILY
Qty: 90 TABLET | Refills: 3 | Status: SHIPPED | OUTPATIENT
Start: 2021-09-15 | End: 2022-09-10

## 2021-09-15 NOTE — PROGRESS NOTES
had concerns including Blood Pressure (6 month follow up ) and Diabetes (check up ). Hector Melo is a 61year old female who presents for recheck of her diabetes.   Subjective:   Doing well, still not having vaccine  Last A1c value was 6.8% done 9/15/20 Mouth/Throat:      Mouth: Mucous membranes are moist.   Eyes:      Extraocular Movements: Extraocular movements intact. Pupils: Pupils are equal, round, and reactive to light. Cardiovascular:      Rate and Rhythm: Normal rate and regular rhythm. increased dietary compliance and exercise and will check labs as ordered.     Lab Results   Component Value Date    A1C 6.8 (H) 02/03/2021    A1C 6.5 (H) 07/24/2020      Blood Sugar Medications          metFORMIN HCl  MG Oral Tablet 24 Hr            O Plan:  Stable, continue present management   7. Visit for screening mammogram  -     [facility] SCREENING BILAT (CPT=77067); Future; Expected date: 02/15/2022  8.  Laboratory examination ordered as part of a routine general medical examination  -     Comp Metaboli

## 2021-10-11 ENCOUNTER — TELEMEDICINE (OUTPATIENT)
Dept: FAMILY MEDICINE CLINIC | Facility: CLINIC | Age: 66
End: 2021-10-11

## 2021-10-11 DIAGNOSIS — R50.9 FEVER, UNSPECIFIED FEVER CAUSE: ICD-10-CM

## 2021-10-11 DIAGNOSIS — Z20.822 CLOSE EXPOSURE TO COVID-19 VIRUS: Primary | ICD-10-CM

## 2021-10-11 PROCEDURE — 99443 PHONE E/M BY PHYS 21-30 MIN: CPT | Performed by: FAMILY MEDICINE

## 2021-10-11 NOTE — PROGRESS NOTES
Lilian Cardenas is a 61year old female coming in for had concerns including Covid (wanted test for covid, sister was tested positive ).     Subjective:   HPI exposure to sister in [place] last week, she tested positive Saturday, now 99.1 temp, minimal symptoms, drove alissa

## 2021-10-12 ENCOUNTER — LAB ENCOUNTER (OUTPATIENT)
Dept: LAB | Age: 66
End: 2021-10-12
Attending: FAMILY MEDICINE
Payer: COMMERCIAL

## 2021-10-12 DIAGNOSIS — Z20.822 CLOSE EXPOSURE TO COVID-19 VIRUS: ICD-10-CM

## 2021-12-11 DIAGNOSIS — E11.9 TYPE 2 DIABETES MELLITUS WITHOUT COMPLICATION, WITHOUT LONG-TERM CURRENT USE OF INSULIN (HCC): ICD-10-CM

## 2021-12-13 RX ORDER — BLOOD SUGAR DIAGNOSTIC
STRIP MISCELLANEOUS
Qty: 100 STRIP | Refills: 3 | Status: SHIPPED | OUTPATIENT
Start: 2021-12-13

## 2021-12-13 NOTE — TELEPHONE ENCOUNTER
Requested Prescriptions     Pending Prescriptions Disp Refills   • Glucose Blood (ACCU-CHEK SMARTVIEW) In Vitro Strip 100 strip 3     Sig: USE ONE STRIP TO CHECK GLUCOSE ONCE DAILY     LOV 9/15/2021     Patient was asked to follow-up in: 6 months    Appoin

## 2021-12-14 ENCOUNTER — HOSPITAL ENCOUNTER (OUTPATIENT)
Dept: MAMMOGRAPHY | Age: 66
Discharge: HOME | End: 2021-12-14
Attending: FAMILY MEDICINE
Payer: COMMERCIAL

## 2021-12-14 DIAGNOSIS — Z12.31 VISIT FOR SCREENING MAMMOGRAM: ICD-10-CM

## 2021-12-14 PROCEDURE — 77067 SCR MAMMO BI INCL CAD: CPT | Performed by: FAMILY MEDICINE

## 2021-12-14 PROCEDURE — 77063 BREAST TOMOSYNTHESIS BI: CPT | Performed by: FAMILY MEDICINE

## 2022-01-05 ENCOUNTER — TELEMEDICINE (OUTPATIENT)
Dept: FAMILY MEDICINE CLINIC | Facility: CLINIC | Age: 67
End: 2022-01-05

## 2022-01-05 DIAGNOSIS — R05.9 COUGH: Primary | ICD-10-CM

## 2022-01-05 DIAGNOSIS — R68.83 CHILLS: ICD-10-CM

## 2022-01-05 PROCEDURE — 99213 OFFICE O/P EST LOW 20 MIN: CPT | Performed by: FAMILY MEDICINE

## 2022-01-07 ENCOUNTER — NURSE ONLY (OUTPATIENT)
Dept: LAB | Age: 67
End: 2022-01-07
Attending: FAMILY MEDICINE
Payer: COMMERCIAL

## 2022-01-07 DIAGNOSIS — R05.9 COUGH: ICD-10-CM

## 2022-01-07 DIAGNOSIS — R68.83 CHILLS: ICD-10-CM

## 2022-01-09 LAB — SARS-COV-2 RNA RESP QL NAA+PROBE: DETECTED

## 2022-02-18 ENCOUNTER — TELEPHONE (OUTPATIENT)
Dept: FAMILY MEDICINE CLINIC | Facility: CLINIC | Age: 67
End: 2022-02-18

## 2022-02-18 NOTE — TELEPHONE ENCOUNTER
Patient's insurance has changed which has changed their preferred lab to [facility]. Patient asking that lab orders entered be switched to [facility]. Has upcoming appointment 03/23/22

## 2022-03-19 ENCOUNTER — TELEPHONE (OUTPATIENT)
Dept: FAMILY MEDICINE CLINIC | Facility: CLINIC | Age: 67
End: 2022-03-19

## 2022-03-19 PROCEDURE — 3061F NEG MICROALBUMINURIA REV: CPT | Performed by: FAMILY MEDICINE

## 2022-03-19 PROCEDURE — 3044F HG A1C LEVEL LT 7.0%: CPT | Performed by: FAMILY MEDICINE

## 2022-03-20 LAB
ABSOLUTE BASOPHILS: 60 CELLS/UL (ref 0–200)
ABSOLUTE EOSINOPHILS: 196 CELLS/UL (ref 15–500)
ABSOLUTE LYMPHOCYTES: 1344 CELLS/UL (ref 850–3900)
ABSOLUTE MONOCYTES: 287 CELLS/UL (ref 200–950)
ABSOLUTE NEUTROPHILS: 1614 CELLS/UL (ref 1500–7800)
ALBUMIN/GLOBULIN RATIO: 2.3 (CALC) (ref 1–2.5)
ALBUMIN: 4.8 G/DL (ref 3.6–5.1)
ALKALINE PHOSPHATASE: 55 U/L (ref 37–153)
ALT: 24 U/L (ref 6–29)
AST: 22 U/L (ref 10–35)
BASOPHILS: 1.7 %
BILIRUBIN, TOTAL: 0.8 MG/DL (ref 0.2–1.2)
BUN: 14 MG/DL (ref 7–25)
CALCIUM: 9.8 MG/DL (ref 8.6–10.4)
CARBON DIOXIDE: 28 MMOL/L (ref 20–32)
CHLORIDE: 103 MMOL/L (ref 98–110)
CHOL/HDLC RATIO: 2.3 (CALC)
CHOLESTEROL, TOTAL: 170 MG/DL
EGFR IF AFRICN AM: 113 ML/MIN/1.73M2
EGFR IF NONAFRICN AM: 97 ML/MIN/1.73M2
EOSINOPHILS: 5.6 %
GLOBULIN: 2.1 G/DL (CALC) (ref 1.9–3.7)
GLUCOSE: 135 MG/DL (ref 65–99)
HDL CHOLESTEROL: 73 MG/DL
HEMATOCRIT: 42.1 % (ref 35–45)
HEMOGLOBIN A1C: 6.8 % OF TOTAL HGB
HEMOGLOBIN: 13.6 G/DL (ref 11.7–15.5)
LDL-CHOLESTEROL: 77 MG/DL (CALC)
LYMPHOCYTES: 38.4 %
MCH: 28.5 PG (ref 27–33)
MCHC: 32.3 G/DL (ref 32–36)
MCV: 88.3 FL (ref 80–100)
MICROALBUMIN: <0.2 MG/DL
MONOCYTES: 8.2 %
MPV: 11.4 FL (ref 7.5–12.5)
NEUTROPHILS: 46.1 %
NON-HDL CHOLESTEROL: 97 MG/DL (CALC)
PLATELET COUNT: 188 THOUSAND/UL (ref 140–400)
POTASSIUM: 4 MMOL/L (ref 3.5–5.3)
PROTEIN, TOTAL: 6.9 G/DL (ref 6.1–8.1)
RDW: 13.1 % (ref 11–15)
RED BLOOD CELL COUNT: 4.77 MILLION/UL (ref 3.8–5.1)
SODIUM: 140 MMOL/L (ref 135–146)
TRIGLYCERIDES: 113 MG/DL
WHITE BLOOD CELL COUNT: 3.5 THOUSAND/UL (ref 3.8–10.8)

## 2022-03-22 NOTE — ASSESSMENT & PLAN NOTE
As for her Diabetes, it is well controlled, no significant medication side effects noted. Recommendations are: continue present meds, lose weight by increased dietary compliance and exercise and will check labs as ordered.     Lab Results   Component Value Date    A1C 6.8 (H) 03/19/2022    A1C 6.8 (A) 09/15/2021      Blood Sugar Medications          metFORMIN HCl  MG Oral Tablet 24 Hr

## 2022-03-22 NOTE — ASSESSMENT & PLAN NOTE
Stable, Continue present management.     Thyroid  (most recent labs)   Lab Results   Component Value Date/Time    TSH 0.978 02/03/2021 09:33 AM    T4F 1.1 02/03/2021 09:33 AM    TSHT4 0.51 02/01/2014 08:58 AM         Endocrine Medications          Levothyroxine Sodium 75 MCG Oral Tab

## 2022-03-23 ENCOUNTER — OFFICE VISIT (OUTPATIENT)
Dept: FAMILY MEDICINE CLINIC | Facility: CLINIC | Age: 67
End: 2022-03-23
Payer: COMMERCIAL

## 2022-03-23 VITALS
SYSTOLIC BLOOD PRESSURE: 140 MMHG | HEIGHT: 60 IN | WEIGHT: 190.81 LBS | HEART RATE: 72 BPM | RESPIRATION RATE: 18 BRPM | BODY MASS INDEX: 37.46 KG/M2 | DIASTOLIC BLOOD PRESSURE: 90 MMHG

## 2022-03-23 DIAGNOSIS — E78.2 MIXED HYPERLIPIDEMIA: ICD-10-CM

## 2022-03-23 DIAGNOSIS — E66.01 SEVERE OBESITY (BMI 35.0-35.9 WITH COMORBIDITY) (HCC): ICD-10-CM

## 2022-03-23 DIAGNOSIS — E03.9 ACQUIRED HYPOTHYROIDISM: ICD-10-CM

## 2022-03-23 DIAGNOSIS — G35 MULTIPLE SCLEROSIS (HCC): Chronic | ICD-10-CM

## 2022-03-23 DIAGNOSIS — I10 ESSENTIAL HYPERTENSION: Chronic | ICD-10-CM

## 2022-03-23 DIAGNOSIS — Z00.00 ANNUAL PHYSICAL EXAM: Primary | ICD-10-CM

## 2022-03-23 DIAGNOSIS — E11.65 TYPE 2 DIABETES MELLITUS WITH HYPERGLYCEMIA, WITHOUT LONG-TERM CURRENT USE OF INSULIN (HCC): ICD-10-CM

## 2022-03-23 PROCEDURE — 3077F SYST BP >= 140 MM HG: CPT | Performed by: FAMILY MEDICINE

## 2022-03-23 PROCEDURE — 99396 PREV VISIT EST AGE 40-64: CPT | Performed by: FAMILY MEDICINE

## 2022-03-23 PROCEDURE — 3080F DIAST BP >= 90 MM HG: CPT | Performed by: FAMILY MEDICINE

## 2022-03-23 PROCEDURE — 3008F BODY MASS INDEX DOCD: CPT | Performed by: FAMILY MEDICINE

## 2022-04-29 ENCOUNTER — PATIENT OUTREACH (OUTPATIENT)
Dept: FAMILY MEDICINE CLINIC | Facility: CLINIC | Age: 67
End: 2022-04-29

## 2022-05-23 ENCOUNTER — LAB ENCOUNTER (OUTPATIENT)
Dept: LAB | Facility: HOSPITAL | Age: 67
End: 2022-05-23
Attending: FAMILY MEDICINE
Payer: COMMERCIAL

## 2022-05-23 ENCOUNTER — TELEPHONE (OUTPATIENT)
Dept: FAMILY MEDICINE CLINIC | Facility: CLINIC | Age: 67
End: 2022-05-23

## 2022-05-23 DIAGNOSIS — N39.0 URINARY TRACT INFECTION WITHOUT HEMATURIA, SITE UNSPECIFIED: Primary | ICD-10-CM

## 2022-05-23 LAB
BILIRUB UR QL STRIP.AUTO: NEGATIVE
COLOR UR AUTO: YELLOW
GLUCOSE UR STRIP.AUTO-MCNC: NEGATIVE MG/DL
KETONES UR STRIP.AUTO-MCNC: NEGATIVE MG/DL
NITRITE UR QL STRIP.AUTO: NEGATIVE
PH UR STRIP.AUTO: 5 [PH] (ref 5–8)
PROT UR STRIP.AUTO-MCNC: NEGATIVE MG/DL
RBC UR QL AUTO: NEGATIVE
SP GR UR STRIP.AUTO: 1.01 (ref 1–1.03)
UROBILINOGEN UR STRIP.AUTO-MCNC: <2 MG/DL
WBC #/AREA URNS AUTO: >50 /HPF

## 2022-05-23 PROCEDURE — 87086 URINE CULTURE/COLONY COUNT: CPT | Performed by: FAMILY MEDICINE

## 2022-05-23 PROCEDURE — 81001 URINALYSIS AUTO W/SCOPE: CPT | Performed by: FAMILY MEDICINE

## 2022-05-23 NOTE — TELEPHONE ENCOUNTER
Called and told patient that orders are in and she can go to the outpatient area to do this testing.

## 2022-05-23 NOTE — TELEPHONE ENCOUNTER
1. Urinary tract infection without hematuria, site unspecified (Primary)  -     Urinalysis, Routine  -     Urine Culture, Routine       OK to notify.  Thanks, Vianney Sidhu MD not applicable

## 2022-05-23 NOTE — TELEPHONE ENCOUNTER
Pt is calling she said she thinks she may have a UTI. She has to be at the hospital , [facility], all day with her  and wants to know if we can put in an order for a UA to [facility] for today so she can go to the lab to be checked. She said she is constantly feeling the urge to go but when she goes nothing happens or only a small amount comes out. Please call.

## 2022-09-13 ENCOUNTER — TELEPHONE (OUTPATIENT)
Dept: FAMILY MEDICINE CLINIC | Facility: CLINIC | Age: 67
End: 2022-09-13

## 2022-09-13 DIAGNOSIS — E03.9 ACQUIRED HYPOTHYROIDISM: ICD-10-CM

## 2022-09-13 RX ORDER — LEVOTHYROXINE SODIUM 0.07 MG/1
75 TABLET ORAL DAILY
Qty: 90 TABLET | Refills: 0 | Status: SHIPPED | OUTPATIENT
Start: 2022-09-13

## 2022-09-13 NOTE — TELEPHONE ENCOUNTER
Pt is requesting a refill of Levothyroxine Sodium 75 MCG Oral Tab. Pt will run out of the med prior to her next appt with Dr. Kate Magana on 9/28/22.

## 2022-09-27 NOTE — ASSESSMENT & PLAN NOTE
Stable, Continue present management.     Thyroid  (most recent labs)   Lab Results   Component Value Date/Time    TSH 0.978 02/03/2021 09:33 AM    T4F 1.1 02/03/2021 09:33 AM    TSHT4 0.51 02/01/2014 08:58 AM         Endocrine Medications          levothyroxine 75 MCG Oral Tab

## 2022-09-28 ENCOUNTER — OFFICE VISIT (OUTPATIENT)
Dept: FAMILY MEDICINE CLINIC | Facility: CLINIC | Age: 67
End: 2022-09-28

## 2022-09-28 VITALS
DIASTOLIC BLOOD PRESSURE: 88 MMHG | RESPIRATION RATE: 18 BRPM | HEART RATE: 80 BPM | SYSTOLIC BLOOD PRESSURE: 138 MMHG | HEIGHT: 61 IN | BODY MASS INDEX: 35.68 KG/M2 | WEIGHT: 189 LBS

## 2022-09-28 DIAGNOSIS — Z00.00 ANNUAL PHYSICAL EXAM: Primary | ICD-10-CM

## 2022-09-28 DIAGNOSIS — Z00.00 ENCOUNTER FOR ANNUAL HEALTH EXAMINATION: ICD-10-CM

## 2022-09-28 DIAGNOSIS — E66.01 SEVERE OBESITY (BMI 35.0-35.9 WITH COMORBIDITY) (HCC): ICD-10-CM

## 2022-09-28 DIAGNOSIS — Z78.0 ASYMPTOMATIC MENOPAUSE: ICD-10-CM

## 2022-09-28 DIAGNOSIS — I10 ESSENTIAL HYPERTENSION: Chronic | ICD-10-CM

## 2022-09-28 DIAGNOSIS — E78.2 MIXED HYPERLIPIDEMIA: ICD-10-CM

## 2022-09-28 DIAGNOSIS — E11.65 TYPE 2 DIABETES MELLITUS WITH HYPERGLYCEMIA, WITHOUT LONG-TERM CURRENT USE OF INSULIN (HCC): ICD-10-CM

## 2022-09-28 DIAGNOSIS — Z12.31 VISIT FOR SCREENING MAMMOGRAM: ICD-10-CM

## 2022-09-28 DIAGNOSIS — Z78.0 POSTMENOPAUSAL: ICD-10-CM

## 2022-09-28 DIAGNOSIS — G35 MULTIPLE SCLEROSIS (HCC): Chronic | ICD-10-CM

## 2022-09-28 DIAGNOSIS — Z23 NEED FOR VACCINATION: ICD-10-CM

## 2022-09-28 DIAGNOSIS — E03.9 ACQUIRED HYPOTHYROIDISM: ICD-10-CM

## 2022-09-28 LAB
CARTRIDGE LOT#: 924 NUMERIC
HEMOGLOBIN A1C: 6.8 % (ref 4.3–5.6)

## 2022-09-28 PROCEDURE — 90677 PCV20 VACCINE IM: CPT | Performed by: FAMILY MEDICINE

## 2022-09-28 PROCEDURE — 99213 OFFICE O/P EST LOW 20 MIN: CPT | Performed by: FAMILY MEDICINE

## 2022-09-28 PROCEDURE — G0009 ADMIN PNEUMOCOCCAL VACCINE: HCPCS | Performed by: FAMILY MEDICINE

## 2022-09-28 PROCEDURE — G0402 INITIAL PREVENTIVE EXAM: HCPCS | Performed by: FAMILY MEDICINE

## 2022-09-28 PROCEDURE — 83036 HEMOGLOBIN GLYCOSYLATED A1C: CPT | Performed by: FAMILY MEDICINE

## 2022-09-28 PROCEDURE — G0403 EKG FOR INITIAL PREVENT EXAM: HCPCS | Performed by: FAMILY MEDICINE

## 2022-09-28 RX ORDER — LISINOPRIL 5 MG/1
5 TABLET ORAL DAILY
Qty: 90 TABLET | Refills: 3 | Status: SHIPPED | OUTPATIENT
Start: 2022-09-28

## 2022-09-28 RX ORDER — METFORMIN HYDROCHLORIDE 500 MG/1
1500 TABLET, EXTENDED RELEASE ORAL
Qty: 270 TABLET | Refills: 3 | Status: SHIPPED | OUTPATIENT
Start: 2022-09-28

## 2022-11-10 ENCOUNTER — TELEPHONE (OUTPATIENT)
Dept: FAMILY MEDICINE CLINIC | Facility: CLINIC | Age: 67
End: 2022-11-10

## 2022-11-10 NOTE — TELEPHONE ENCOUNTER
Called [facility] in detail with Dr Wyman Landing thoughts on this problem and to not change her medication and just monitor her blood sugars

## 2022-11-10 NOTE — TELEPHONE ENCOUNTER
Pt is calling she recently had her son passed away and has not been eating well and her diabetes number is at 245 now what can she do to lower it so she doesn't pass out at her son's wake.

## 2022-11-10 NOTE — TELEPHONE ENCOUNTER
Your standard concern and agreed that the 200 should not make her pass out. Being overaggressive with the meds could make her more likely to pass out. Adding more metformin might make her have more diarrhea and the stress of this could cause more diarrhea as well. At this point I would suggest we keep monitoring.   If the blood sugars are running in the 100s to 250s, she will not likely pass out unless we push the meds further

## 2022-11-10 NOTE — TELEPHONE ENCOUNTER
Talked to patient, she is taking medication and not eating due to the stress of the situation. We discussed stress elevating the blood sugar and thsi might be the cause. She wanted to know what Dr Cayetano Marquez thought and should she take an extra metformin?

## 2022-12-10 DIAGNOSIS — E03.9 ACQUIRED HYPOTHYROIDISM: ICD-10-CM

## 2022-12-11 RX ORDER — LEVOTHYROXINE SODIUM 0.07 MG/1
TABLET ORAL
Qty: 90 TABLET | Refills: 0 | Status: SHIPPED | OUTPATIENT
Start: 2022-12-11

## 2023-03-01 ENCOUNTER — HOSPITAL ENCOUNTER (OUTPATIENT)
Dept: MAMMOGRAPHY | Age: 68
Discharge: HOME | End: 2023-03-01
Attending: FAMILY MEDICINE
Payer: MEDICARE

## 2023-03-01 ENCOUNTER — HOSPITAL ENCOUNTER (OUTPATIENT)
Dept: BONE DENSITY | Age: 68
Discharge: HOME | End: 2023-03-01
Attending: FAMILY MEDICINE
Payer: MEDICARE

## 2023-03-01 ENCOUNTER — LAB ENCOUNTER (OUTPATIENT)
Dept: LAB | Age: 68
End: 2023-03-01
Attending: FAMILY MEDICINE
Payer: MEDICARE

## 2023-03-01 DIAGNOSIS — E11.65 TYPE 2 DIABETES MELLITUS WITH HYPERGLYCEMIA, WITHOUT LONG-TERM CURRENT USE OF INSULIN (HCC): ICD-10-CM

## 2023-03-01 DIAGNOSIS — Z12.31 VISIT FOR SCREENING MAMMOGRAM: ICD-10-CM

## 2023-03-01 DIAGNOSIS — G35 MULTIPLE SCLEROSIS (HCC): ICD-10-CM

## 2023-03-01 DIAGNOSIS — Z78.0 POSTMENOPAUSAL: ICD-10-CM

## 2023-03-01 DIAGNOSIS — E03.9 ACQUIRED HYPOTHYROIDISM: ICD-10-CM

## 2023-03-01 LAB
ALBUMIN SERPL-MCNC: 4.2 G/DL (ref 3.4–5)
ALBUMIN/GLOB SERPL: 1.4 {RATIO} (ref 1–2)
ALP LIVER SERPL-CCNC: 61 U/L
ALT SERPL-CCNC: 34 U/L
ANION GAP SERPL CALC-SCNC: 8 MMOL/L (ref 0–18)
AST SERPL-CCNC: 23 U/L (ref 15–37)
BASOPHILS # BLD AUTO: 0.07 X10(3) UL (ref 0–0.2)
BASOPHILS NFR BLD AUTO: 1.8 %
BILIRUB SERPL-MCNC: 0.8 MG/DL (ref 0.1–2)
BUN BLD-MCNC: 15 MG/DL (ref 7–18)
CALCIUM BLD-MCNC: 9.6 MG/DL (ref 8.5–10.1)
CHLORIDE SERPL-SCNC: 105 MMOL/L (ref 98–112)
CHOLEST SERPL-MCNC: 179 MG/DL (ref ?–200)
CO2 SERPL-SCNC: 24 MMOL/L (ref 21–32)
CREAT BLD-MCNC: 0.65 MG/DL
CREAT UR-SCNC: 81.7 MG/DL
EOSINOPHIL # BLD AUTO: 0.22 X10(3) UL (ref 0–0.7)
EOSINOPHIL NFR BLD AUTO: 5.6 %
ERYTHROCYTE [DISTWIDTH] IN BLOOD BY AUTOMATED COUNT: 12.7 %
EST. AVERAGE GLUCOSE BLD GHB EST-MCNC: 171 MG/DL (ref 68–126)
FASTING PATIENT LIPID ANSWER: YES
FASTING STATUS PATIENT QL REPORTED: YES
GFR SERPLBLD BASED ON 1.73 SQ M-ARVRAT: 98 ML/MIN/1.73M2 (ref 60–?)
GLOBULIN PLAS-MCNC: 3 G/DL (ref 2.8–4.4)
GLUCOSE BLD-MCNC: 141 MG/DL (ref 70–99)
HBA1C MFR BLD: 7.6 % (ref ?–5.7)
HCT VFR BLD AUTO: 43.5 %
HDLC SERPL-MCNC: 72 MG/DL (ref 40–59)
HGB BLD-MCNC: 14 G/DL
IMM GRANULOCYTES # BLD AUTO: 0.01 X10(3) UL (ref 0–1)
IMM GRANULOCYTES NFR BLD: 0.3 %
LDLC SERPL CALC-MCNC: 80 MG/DL (ref ?–100)
LYMPHOCYTES # BLD AUTO: 1.42 X10(3) UL (ref 1–4)
LYMPHOCYTES NFR BLD AUTO: 35.9 %
MCH RBC QN AUTO: 29.4 PG (ref 26–34)
MCHC RBC AUTO-ENTMCNC: 32.2 G/DL (ref 31–37)
MCV RBC AUTO: 91.4 FL
MICROALBUMIN UR-MCNC: 0.58 MG/DL
MICROALBUMIN/CREAT 24H UR-RTO: 7.1 UG/MG (ref ?–30)
MONOCYTES # BLD AUTO: 0.34 X10(3) UL (ref 0.1–1)
MONOCYTES NFR BLD AUTO: 8.6 %
NEUTROPHILS # BLD AUTO: 1.89 X10 (3) UL (ref 1.5–7.7)
NEUTROPHILS # BLD AUTO: 1.89 X10(3) UL (ref 1.5–7.7)
NEUTROPHILS NFR BLD AUTO: 47.8 %
NONHDLC SERPL-MCNC: 107 MG/DL (ref ?–130)
OSMOLALITY SERPL CALC.SUM OF ELEC: 287 MOSM/KG (ref 275–295)
PLATELET # BLD AUTO: 189 10(3)UL (ref 150–450)
POTASSIUM SERPL-SCNC: 3.8 MMOL/L (ref 3.5–5.1)
PROT SERPL-MCNC: 7.2 G/DL (ref 6.4–8.2)
RBC # BLD AUTO: 4.76 X10(6)UL
SODIUM SERPL-SCNC: 137 MMOL/L (ref 136–145)
T4 FREE SERPL-MCNC: 1 NG/DL (ref 0.8–1.7)
TRIGL SERPL-MCNC: 164 MG/DL (ref 30–149)
TSI SER-ACNC: 4.81 MIU/ML (ref 0.36–3.74)
VLDLC SERPL CALC-MCNC: 26 MG/DL (ref 0–30)
WBC # BLD AUTO: 4 X10(3) UL (ref 4–11)

## 2023-03-01 PROCEDURE — 85025 COMPLETE CBC W/AUTO DIFF WBC: CPT

## 2023-03-01 PROCEDURE — 80053 COMPREHEN METABOLIC PANEL: CPT

## 2023-03-01 PROCEDURE — 80061 LIPID PANEL: CPT

## 2023-03-01 PROCEDURE — 83036 HEMOGLOBIN GLYCOSYLATED A1C: CPT

## 2023-03-01 PROCEDURE — 77080 DXA BONE DENSITY AXIAL: CPT | Performed by: FAMILY MEDICINE

## 2023-03-01 PROCEDURE — 84443 ASSAY THYROID STIM HORMONE: CPT

## 2023-03-01 PROCEDURE — 77063 BREAST TOMOSYNTHESIS BI: CPT | Performed by: FAMILY MEDICINE

## 2023-03-01 PROCEDURE — 77067 SCR MAMMO BI INCL CAD: CPT | Performed by: FAMILY MEDICINE

## 2023-03-01 PROCEDURE — 84439 ASSAY OF FREE THYROXINE: CPT

## 2023-03-01 PROCEDURE — 82043 UR ALBUMIN QUANTITATIVE: CPT

## 2023-03-01 PROCEDURE — 36415 COLL VENOUS BLD VENIPUNCTURE: CPT

## 2023-03-01 PROCEDURE — 82570 ASSAY OF URINE CREATININE: CPT

## 2023-03-16 NOTE — ASSESSMENT & PLAN NOTE
Stable, Continue present management.     Thyroid  (most recent labs)   Lab Results   Component Value Date/Time    TSH 4.810 (H) 03/01/2023 07:50 AM    T4F 1.0 03/01/2023 07:50 AM    TSHT4 0.51 02/01/2014 08:58 AM         Endocrine Medications          LEVOTHYROXINE 75 MCG Oral Tab

## 2023-03-16 NOTE — ASSESSMENT & PLAN NOTE
As for her Diabetes, it is well controlled, no significant medication side effects noted. Recommendations are: continue present meds, lose weight by increased dietary compliance and exercise and will check labs as ordered.     Lab Results   Component Value Date    A1C 7.6 (H) 03/01/2023    A1C 6.8 (A) 09/28/2022      Blood Sugar Medications          metFORMIN  MG Oral Tablet 24 Hr

## 2023-03-17 ENCOUNTER — OFFICE VISIT (OUTPATIENT)
Dept: FAMILY MEDICINE CLINIC | Facility: CLINIC | Age: 68
End: 2023-03-17
Payer: MEDICARE

## 2023-03-17 VITALS
BODY MASS INDEX: 36.09 KG/M2 | SYSTOLIC BLOOD PRESSURE: 146 MMHG | RESPIRATION RATE: 16 BRPM | HEART RATE: 74 BPM | WEIGHT: 191.13 LBS | DIASTOLIC BLOOD PRESSURE: 80 MMHG | HEIGHT: 61 IN

## 2023-03-17 DIAGNOSIS — G35 MULTIPLE SCLEROSIS (HCC): Chronic | ICD-10-CM

## 2023-03-17 DIAGNOSIS — E11.65 TYPE 2 DIABETES MELLITUS WITH HYPERGLYCEMIA, WITHOUT LONG-TERM CURRENT USE OF INSULIN (HCC): Primary | ICD-10-CM

## 2023-03-17 DIAGNOSIS — E66.01 SEVERE OBESITY (BMI 35.0-35.9 WITH COMORBIDITY) (HCC): ICD-10-CM

## 2023-03-17 DIAGNOSIS — E03.9 ACQUIRED HYPOTHYROIDISM: ICD-10-CM

## 2023-03-17 DIAGNOSIS — E78.2 MIXED HYPERLIPIDEMIA: ICD-10-CM

## 2023-03-17 DIAGNOSIS — I10 ESSENTIAL HYPERTENSION: Chronic | ICD-10-CM

## 2023-03-17 PROCEDURE — 99214 OFFICE O/P EST MOD 30 MIN: CPT | Performed by: FAMILY MEDICINE

## 2023-03-17 RX ORDER — BLOOD SUGAR DIAGNOSTIC
STRIP MISCELLANEOUS
Qty: 100 STRIP | Refills: 4 | Status: SHIPPED | OUTPATIENT
Start: 2023-03-17

## 2023-03-17 RX ORDER — LOSARTAN POTASSIUM 25 MG/1
25 TABLET ORAL DAILY
Qty: 90 TABLET | Refills: 3 | Status: SHIPPED | OUTPATIENT
Start: 2023-03-17

## 2023-03-17 RX ORDER — ATORVASTATIN CALCIUM 20 MG/1
20 TABLET, FILM COATED ORAL DAILY
Qty: 90 TABLET | Refills: 3 | Status: SHIPPED | OUTPATIENT
Start: 2023-03-17 | End: 2024-03-11

## 2023-03-20 DIAGNOSIS — E03.9 ACQUIRED HYPOTHYROIDISM: ICD-10-CM

## 2023-03-22 RX ORDER — LEVOTHYROXINE SODIUM 0.07 MG/1
TABLET ORAL
Qty: 90 TABLET | Refills: 0 | Status: SHIPPED | OUTPATIENT
Start: 2023-03-22

## 2023-06-10 ENCOUNTER — LAB ENCOUNTER (OUTPATIENT)
Dept: LAB | Age: 68
End: 2023-06-10
Attending: FAMILY MEDICINE
Payer: MEDICARE

## 2023-06-10 DIAGNOSIS — E11.65 TYPE 2 DIABETES MELLITUS WITH HYPERGLYCEMIA, WITHOUT LONG-TERM CURRENT USE OF INSULIN (HCC): ICD-10-CM

## 2023-06-10 DIAGNOSIS — E03.9 ACQUIRED HYPOTHYROIDISM: ICD-10-CM

## 2023-06-10 LAB
ALBUMIN SERPL-MCNC: 4 G/DL (ref 3.4–5)
ALBUMIN/GLOB SERPL: 1.5 {RATIO} (ref 1–2)
ALP LIVER SERPL-CCNC: 51 U/L
ALT SERPL-CCNC: 35 U/L
ANION GAP SERPL CALC-SCNC: 3 MMOL/L (ref 0–18)
AST SERPL-CCNC: 23 U/L (ref 15–37)
BILIRUB SERPL-MCNC: 0.8 MG/DL (ref 0.1–2)
BUN BLD-MCNC: 11 MG/DL (ref 7–18)
CALCIUM BLD-MCNC: 9 MG/DL (ref 8.5–10.1)
CHLORIDE SERPL-SCNC: 108 MMOL/L (ref 98–112)
CO2 SERPL-SCNC: 24 MMOL/L (ref 21–32)
CREAT BLD-MCNC: 0.61 MG/DL
EST. AVERAGE GLUCOSE BLD GHB EST-MCNC: 169 MG/DL (ref 68–126)
FASTING STATUS PATIENT QL REPORTED: YES
GFR SERPLBLD BASED ON 1.73 SQ M-ARVRAT: 99 ML/MIN/1.73M2 (ref 60–?)
GLOBULIN PLAS-MCNC: 2.7 G/DL (ref 2.8–4.4)
GLUCOSE BLD-MCNC: 147 MG/DL (ref 70–99)
HBA1C MFR BLD: 7.5 % (ref ?–5.7)
OSMOLALITY SERPL CALC.SUM OF ELEC: 282 MOSM/KG (ref 275–295)
POTASSIUM SERPL-SCNC: 3.8 MMOL/L (ref 3.5–5.1)
PROT SERPL-MCNC: 6.7 G/DL (ref 6.4–8.2)
SODIUM SERPL-SCNC: 135 MMOL/L (ref 136–145)
T4 FREE SERPL-MCNC: 1.2 NG/DL (ref 0.8–1.7)
TSI SER-ACNC: 0.46 MIU/ML (ref 0.36–3.74)

## 2023-06-10 PROCEDURE — 83036 HEMOGLOBIN GLYCOSYLATED A1C: CPT

## 2023-06-10 PROCEDURE — 84439 ASSAY OF FREE THYROXINE: CPT

## 2023-06-10 PROCEDURE — 84443 ASSAY THYROID STIM HORMONE: CPT

## 2023-06-10 PROCEDURE — 80053 COMPREHEN METABOLIC PANEL: CPT

## 2023-06-10 PROCEDURE — 36415 COLL VENOUS BLD VENIPUNCTURE: CPT

## 2023-06-16 ENCOUNTER — OFFICE VISIT (OUTPATIENT)
Dept: FAMILY MEDICINE CLINIC | Facility: CLINIC | Age: 68
End: 2023-06-16
Payer: MEDICARE

## 2023-06-16 ENCOUNTER — TELEPHONE (OUTPATIENT)
Dept: FAMILY MEDICINE CLINIC | Facility: CLINIC | Age: 68
End: 2023-06-16

## 2023-06-16 VITALS
HEART RATE: 80 BPM | RESPIRATION RATE: 16 BRPM | BODY MASS INDEX: 35.61 KG/M2 | HEIGHT: 61 IN | DIASTOLIC BLOOD PRESSURE: 70 MMHG | SYSTOLIC BLOOD PRESSURE: 126 MMHG | WEIGHT: 188.63 LBS

## 2023-06-16 DIAGNOSIS — E03.9 ACQUIRED HYPOTHYROIDISM: ICD-10-CM

## 2023-06-16 DIAGNOSIS — E78.2 MIXED HYPERLIPIDEMIA: ICD-10-CM

## 2023-06-16 DIAGNOSIS — I10 ESSENTIAL HYPERTENSION: Chronic | ICD-10-CM

## 2023-06-16 DIAGNOSIS — E66.01 SEVERE OBESITY (BMI 35.0-35.9 WITH COMORBIDITY) (HCC): ICD-10-CM

## 2023-06-16 DIAGNOSIS — Z00.00 LABORATORY EXAMINATION ORDERED AS PART OF A ROUTINE GENERAL MEDICAL EXAMINATION: ICD-10-CM

## 2023-06-16 DIAGNOSIS — E66.01 SEVERE OBESITY (BMI 35.0-35.9 WITH COMORBIDITY): ICD-10-CM

## 2023-06-16 DIAGNOSIS — E11.65 TYPE 2 DIABETES MELLITUS WITH HYPERGLYCEMIA, WITHOUT LONG-TERM CURRENT USE OF INSULIN (HCC): Primary | ICD-10-CM

## 2023-06-16 DIAGNOSIS — R73.9 HYPERGLYCEMIA: ICD-10-CM

## 2023-06-16 PROCEDURE — 99214 OFFICE O/P EST MOD 30 MIN: CPT | Performed by: FAMILY MEDICINE

## 2023-06-16 RX ORDER — LEVOTHYROXINE SODIUM 0.07 MG/1
75 TABLET ORAL DAILY
Qty: 90 TABLET | Refills: 3 | Status: SHIPPED | OUTPATIENT
Start: 2023-06-16

## 2023-06-16 NOTE — ASSESSMENT & PLAN NOTE
BP shows poor control with last BP of 146/80. Lifestyle changes, diet, exercise and weight loss were counseled. Medication changes as listed. Last K was 3.8 done on 6/10/2023. Last Cr was 0.61 done on 6/10/2023. Hypertension meds include losartan 25 MG Oral Tab [[id number]].      stable, continue present management

## 2023-06-16 NOTE — TELEPHONE ENCOUNTER
Pt had labs done in March. Please enter lab orders for the patient's upcoming physical appointment. Physical scheduled: Your appointments     Date & Time Appointment Department [facility])    Sep 15, 2023  2:00 PM CDT Medicare Annual Well Visit with Farnco Azevedo MD Chema Munoz, [address], [place] ([address])            [place], [address] Albertina Heap [address] [address] [facility] [phone]         [facility])     The patient has been notified to complete fasting labs prior to their physical appointment.

## 2023-06-16 NOTE — ASSESSMENT & PLAN NOTE
Cholesterol shows Good control and Good compliance. Stable, continue present management   Cholesterol: 179, done on 3/1/2023. HDL Cholesterol: 72, done on 3/1/2023. TriGlycerides 164, done on 3/1/2023. LDL Cholesterol: 80, done on 3/1/2023. Cholesterol medications include atorvastatin 20 MG Oral Tab [[id number]].

## 2023-06-16 NOTE — ASSESSMENT & PLAN NOTE
Thyroid shows Good control. TSH: 0.455, done on 6/10/2023. FT4: 1.2, done on 6/10/2023. Thyroid therapy includes LEVOTHYROXINE 75 MCG Oral Tab [[id number]].

## 2023-06-16 NOTE — ASSESSMENT & PLAN NOTE
A1c is 7.5 done 6/10/2023 which shows hyperglycemia and borderline control, maintain vigilance and increase activity and medications as listed. Diabetic medications include metFORMIN  MG Oral Tablet 24 Hr [[id number]].  stable, continue present management

## 2023-08-13 NOTE — TELEPHONE ENCOUNTER
1. Type 2 diabetes mellitus with hyperglycemia, without long-term current use of insulin ([address] (Primary)  Overview:  Dx 2013, Metformin 1000 daily BID (increased 2/19/2020 )  Orders:  -     Comp Metabolic Panel (14); Future; Expected date: 08/13/2023  -     Lipid Panel; Future; Expected date: 08/13/2023  -     Hemoglobin A1C; Future; Expected date: 08/13/2023  2. Mixed hyperlipidemia  Overview:  Atorvastatin 10  3. Severe obesity (BMI 35.0-35.9 with comorbidity) (HCC)  Overview:  BMI 37  4. Acquired hypothyroidism  Overview:  Levothyroxine 75 (up from 50 8/17/2016)  Orders:  -     Free T4, (Free Thyroxine); Future; Expected date: 08/13/2023  -     Assay, Thyroid Stim Hormone; Future; Expected date: 08/13/2023  5. Laboratory examination ordered as part of a routine general medical examination  -     Comp Metabolic Panel (14); Future; Expected date: 08/13/2023  -     Lipid Panel; Future; Expected date: 08/13/2023  6. Hyperglycemia  -     Hemoglobin A1C; Future; Expected date: 08/13/2023       OK to notify.  Thanks, Liliam Solis MD

## 2023-09-15 ENCOUNTER — OFFICE VISIT (OUTPATIENT)
Dept: FAMILY MEDICINE CLINIC | Facility: CLINIC | Age: 68
End: 2023-09-15
Payer: MEDICARE

## 2023-09-15 VITALS
HEART RATE: 78 BPM | BODY MASS INDEX: 34.96 KG/M2 | RESPIRATION RATE: 16 BRPM | WEIGHT: 185.19 LBS | SYSTOLIC BLOOD PRESSURE: 110 MMHG | DIASTOLIC BLOOD PRESSURE: 80 MMHG | HEIGHT: 61 IN

## 2023-09-15 DIAGNOSIS — I10 ESSENTIAL HYPERTENSION: Chronic | ICD-10-CM

## 2023-09-15 DIAGNOSIS — Z00.00 ANNUAL PHYSICAL EXAM: Primary | ICD-10-CM

## 2023-09-15 DIAGNOSIS — Z12.31 VISIT FOR SCREENING MAMMOGRAM: ICD-10-CM

## 2023-09-15 DIAGNOSIS — E78.2 MIXED HYPERLIPIDEMIA: ICD-10-CM

## 2023-09-15 DIAGNOSIS — E11.65 TYPE 2 DIABETES MELLITUS WITH HYPERGLYCEMIA, WITHOUT LONG-TERM CURRENT USE OF INSULIN (HCC): ICD-10-CM

## 2023-09-15 DIAGNOSIS — Z13.39 SCREENING FOR ALCOHOL PROBLEM: ICD-10-CM

## 2023-09-15 DIAGNOSIS — E66.01 SEVERE OBESITY (BMI 35.0-35.9 WITH COMORBIDITY): ICD-10-CM

## 2023-09-15 DIAGNOSIS — G35 MULTIPLE SCLEROSIS (HCC): Chronic | ICD-10-CM

## 2023-09-15 DIAGNOSIS — E03.9 ACQUIRED HYPOTHYROIDISM: ICD-10-CM

## 2023-09-15 DIAGNOSIS — Z00.00 ENCOUNTER FOR ANNUAL HEALTH EXAMINATION: ICD-10-CM

## 2023-09-15 LAB
CARTRIDGE LOT#: 611 NUMERIC
HEMOGLOBIN A1C: 7.2 % (ref 4.3–5.6)

## 2023-09-15 PROCEDURE — 1126F AMNT PAIN NOTED NONE PRSNT: CPT | Performed by: FAMILY MEDICINE

## 2023-09-15 PROCEDURE — 83036 HEMOGLOBIN GLYCOSYLATED A1C: CPT | Performed by: FAMILY MEDICINE

## 2023-09-15 PROCEDURE — G0438 PPPS, INITIAL VISIT: HCPCS | Performed by: FAMILY MEDICINE

## 2023-09-15 PROCEDURE — 99214 OFFICE O/P EST MOD 30 MIN: CPT | Performed by: FAMILY MEDICINE

## 2023-09-15 RX ORDER — METFORMIN HYDROCHLORIDE 500 MG/1
1500 TABLET, EXTENDED RELEASE ORAL
Qty: 270 TABLET | Refills: 3 | Status: SHIPPED | OUTPATIENT
Start: 2023-09-15

## 2023-09-16 DIAGNOSIS — E11.65 TYPE 2 DIABETES MELLITUS WITH HYPERGLYCEMIA, WITHOUT LONG-TERM CURRENT USE OF INSULIN (HCC): ICD-10-CM

## 2023-09-22 RX ORDER — METFORMIN HYDROCHLORIDE 500 MG/1
1500 TABLET, EXTENDED RELEASE ORAL
Qty: 270 TABLET | Refills: 3 | OUTPATIENT
Start: 2023-09-22

## 2023-11-10 ENCOUNTER — TELEPHONE (OUTPATIENT)
Dept: FAMILY MEDICINE CLINIC | Facility: CLINIC | Age: 68
End: 2023-11-10

## 2023-11-10 ENCOUNTER — PATIENT MESSAGE (OUTPATIENT)
Dept: FAMILY MEDICINE CLINIC | Facility: CLINIC | Age: 68
End: 2023-11-10

## 2023-11-10 DIAGNOSIS — Z12.11 SCREEN FOR COLON CANCER: Primary | ICD-10-CM

## 2023-11-10 NOTE — TELEPHONE ENCOUNTER
Patient calling for a lab order of blue card insure fecal immuno test please call patient [phone].

## 2023-11-13 ENCOUNTER — LAB ENCOUNTER (OUTPATIENT)
Dept: LAB | Age: 68
End: 2023-11-13
Attending: FAMILY MEDICINE
Payer: MEDICARE

## 2023-11-13 DIAGNOSIS — Z12.11 SCREEN FOR COLON CANCER: ICD-10-CM

## 2023-11-13 PROCEDURE — 82274 ASSAY TEST FOR BLOOD FECAL: CPT

## 2023-11-13 NOTE — TELEPHONE ENCOUNTER
From: Sharri Montgomery  To: Gurdeep Luu  Sent: 11/10/2023 6:50 PM CST  Subject: [facility] test    I did my occult blood fecal, immuno (Blue  Card) test. Looked at my After Visit Summary of 9-15-23 and the test is not listed as ordered. If you can still test it I can drop it off at your Office Tuesday or one of the [facility]. Please let me know if it can still be tested if it is 4 days old.  Sharri

## 2023-11-13 NOTE — TELEPHONE ENCOUNTER
1. Screen for colon cancer (Primary)  -     Occult Blood, Fecal, FIT Immunoassay; Future; Expected date: 11/13/2023       OK to notify.  Thanks, Frank Bernard MD

## 2023-11-16 LAB — HEMOCCULT STL QL: NEGATIVE

## 2023-11-17 ENCOUNTER — PATIENT MESSAGE (OUTPATIENT)
Dept: FAMILY MEDICINE CLINIC | Facility: CLINIC | Age: 68
End: 2023-11-17

## 2023-11-20 NOTE — TELEPHONE ENCOUNTER
Please verify if a fax was received for this form. Gillian reyes sent this over last week and will resend today.   Routing to

## 2023-12-29 ENCOUNTER — TELEPHONE (OUTPATIENT)
Facility: CLINIC | Age: 68
End: 2023-12-29

## 2024-03-02 ENCOUNTER — HOSPITAL ENCOUNTER (OUTPATIENT)
Dept: MAMMOGRAPHY | Age: 69
Discharge: HOME | End: 2024-03-02
Attending: FAMILY MEDICINE
Payer: MEDICARE

## 2024-03-02 ENCOUNTER — LAB ENCOUNTER (OUTPATIENT)
Dept: LAB | Age: 69
End: 2024-03-02
Attending: FAMILY MEDICINE
Payer: MEDICARE

## 2024-03-02 DIAGNOSIS — G35 MULTIPLE SCLEROSIS (HCC): ICD-10-CM

## 2024-03-02 DIAGNOSIS — E11.65 TYPE 2 DIABETES MELLITUS WITH HYPERGLYCEMIA, WITHOUT LONG-TERM CURRENT USE OF INSULIN (HCC): ICD-10-CM

## 2024-03-02 DIAGNOSIS — E03.9 ACQUIRED HYPOTHYROIDISM: ICD-10-CM

## 2024-03-02 DIAGNOSIS — Z12.31 VISIT FOR SCREENING MAMMOGRAM: ICD-10-CM

## 2024-03-02 DIAGNOSIS — R73.9 HYPERGLYCEMIA: ICD-10-CM

## 2024-03-02 DIAGNOSIS — Z00.00 LABORATORY EXAMINATION ORDERED AS PART OF A ROUTINE GENERAL MEDICAL EXAMINATION: ICD-10-CM

## 2024-03-02 LAB
ALBUMIN SERPL-MCNC: 4.4 G/DL (ref 3.4–5)
ALBUMIN/GLOB SERPL: 1.6 {RATIO} (ref 1–2)
ALP LIVER SERPL-CCNC: 59 U/L
ALT SERPL-CCNC: 37 U/L
ANION GAP SERPL CALC-SCNC: 6 MMOL/L (ref 0–18)
AST SERPL-CCNC: 18 U/L (ref 15–37)
BASOPHILS # BLD AUTO: 0.06 X10(3) UL (ref 0–0.2)
BASOPHILS NFR BLD AUTO: 1.6 %
BILIRUB SERPL-MCNC: 0.6 MG/DL (ref 0.1–2)
BUN BLD-MCNC: 11 MG/DL (ref 9–23)
CALCIUM BLD-MCNC: 9.7 MG/DL (ref 8.5–10.1)
CHLORIDE SERPL-SCNC: 106 MMOL/L (ref 98–112)
CHOLEST SERPL-MCNC: 147 MG/DL (ref ?–200)
CO2 SERPL-SCNC: 25 MMOL/L (ref 21–32)
CREAT BLD-MCNC: 0.57 MG/DL
CREAT UR-SCNC: 79 MG/DL
EGFRCR SERPLBLD CKD-EPI 2021: 100 ML/MIN/1.73M2 (ref 60–?)
EOSINOPHIL # BLD AUTO: 0.26 X10(3) UL (ref 0–0.7)
EOSINOPHIL NFR BLD AUTO: 6.8 %
ERYTHROCYTE [DISTWIDTH] IN BLOOD BY AUTOMATED COUNT: 12.6 %
EST. AVERAGE GLUCOSE BLD GHB EST-MCNC: 189 MG/DL (ref 68–126)
FASTING PATIENT LIPID ANSWER: YES
FASTING STATUS PATIENT QL REPORTED: YES
GLOBULIN PLAS-MCNC: 2.8 G/DL (ref 2.8–4.4)
GLUCOSE BLD-MCNC: 157 MG/DL (ref 70–99)
HBA1C MFR BLD: 8.2 % (ref ?–5.7)
HCT VFR BLD AUTO: 41 %
HDLC SERPL-MCNC: 70 MG/DL (ref 40–59)
HGB BLD-MCNC: 13.4 G/DL
IMM GRANULOCYTES # BLD AUTO: 0.01 X10(3) UL (ref 0–1)
IMM GRANULOCYTES NFR BLD: 0.3 %
LDLC SERPL CALC-MCNC: 57 MG/DL (ref ?–100)
LYMPHOCYTES # BLD AUTO: 1.47 X10(3) UL (ref 1–4)
LYMPHOCYTES NFR BLD AUTO: 38.7 %
MCH RBC QN AUTO: 29.4 PG (ref 26–34)
MCHC RBC AUTO-ENTMCNC: 32.7 G/DL (ref 31–37)
MCV RBC AUTO: 89.9 FL
MICROALBUMIN UR-MCNC: 2.57 MG/DL
MICROALBUMIN/CREAT 24H UR-RTO: 32.5 UG/MG (ref ?–30)
MONOCYTES # BLD AUTO: 0.42 X10(3) UL (ref 0.1–1)
MONOCYTES NFR BLD AUTO: 11.1 %
NEUTROPHILS # BLD AUTO: 1.58 X10 (3) UL (ref 1.5–7.7)
NEUTROPHILS # BLD AUTO: 1.58 X10(3) UL (ref 1.5–7.7)
NEUTROPHILS NFR BLD AUTO: 41.5 %
NONHDLC SERPL-MCNC: 77 MG/DL (ref ?–130)
OSMOLALITY SERPL CALC.SUM OF ELEC: 287 MOSM/KG (ref 275–295)
PLATELET # BLD AUTO: 194 10(3)UL (ref 150–450)
POTASSIUM SERPL-SCNC: 3.7 MMOL/L (ref 3.5–5.1)
PROT SERPL-MCNC: 7.2 G/DL (ref 6.4–8.2)
RBC # BLD AUTO: 4.56 X10(6)UL
SODIUM SERPL-SCNC: 137 MMOL/L (ref 136–145)
T4 FREE SERPL-MCNC: 1.1 NG/DL (ref 0.8–1.7)
TRIGL SERPL-MCNC: 115 MG/DL (ref 30–149)
TSI SER-ACNC: 0.9 MIU/ML (ref 0.36–3.74)
VLDLC SERPL CALC-MCNC: 17 MG/DL (ref 0–30)
WBC # BLD AUTO: 3.8 X10(3) UL (ref 4–11)

## 2024-03-02 PROCEDURE — 80061 LIPID PANEL: CPT

## 2024-03-02 PROCEDURE — 85025 COMPLETE CBC W/AUTO DIFF WBC: CPT

## 2024-03-02 PROCEDURE — 77063 BREAST TOMOSYNTHESIS BI: CPT | Performed by: FAMILY MEDICINE

## 2024-03-02 PROCEDURE — 83036 HEMOGLOBIN GLYCOSYLATED A1C: CPT

## 2024-03-02 PROCEDURE — 80053 COMPREHEN METABOLIC PANEL: CPT

## 2024-03-02 PROCEDURE — 84443 ASSAY THYROID STIM HORMONE: CPT

## 2024-03-02 PROCEDURE — 82043 UR ALBUMIN QUANTITATIVE: CPT

## 2024-03-02 PROCEDURE — 36415 COLL VENOUS BLD VENIPUNCTURE: CPT

## 2024-03-02 PROCEDURE — 82570 ASSAY OF URINE CREATININE: CPT

## 2024-03-02 PROCEDURE — 84439 ASSAY OF FREE THYROXINE: CPT

## 2024-03-02 PROCEDURE — 77067 SCR MAMMO BI INCL CAD: CPT | Performed by: FAMILY MEDICINE

## 2024-03-12 NOTE — ASSESSMENT & PLAN NOTE
Last K was 3.7 done on 3/2/2024.  Last Cr was 0.57 done on 3/2/2024.  Last eGFR was 100 on 3/2/2024.  BP Meds: losartan Tabs - 25 MG

## 2024-03-12 NOTE — ASSESSMENT & PLAN NOTE
Cholesterol shows Good control. Long term heart-healthy diet and lifestyle discussed and encouraged to reduce risk of cardiovascular disease.  Cholesterol: 147, done on 3/2/2024.  HDL Cholesterol: 70, done on 3/2/2024.  TriGlycerides 115, done on 3/2/2024.  LDL Cholesterol: 57, done on 3/2/2024.   Cholesterol medications include atorvastatin 20 MG Oral Tab [[id number]], atorvastatin 20 MG Oral Tab [[id number]] (Long-Term Med).

## 2024-03-12 NOTE — ASSESSMENT & PLAN NOTE
A1c is 8.2 done 3/2/2024 which shows frequent hyperglycemia and poor control! Encouraged better dietary choices and portion control, and increased activity and med changes as listed.  Diabetic medications include metFORMIN  MG Oral Tablet 24 Hr [[id number]], metFORMIN  MG Oral Tablet 24 Hr [[id number]] (Long-Term Med).

## 2024-03-13 ENCOUNTER — OFFICE VISIT (OUTPATIENT)
Dept: FAMILY MEDICINE CLINIC | Facility: CLINIC | Age: 69
End: 2024-03-13
Payer: MEDICARE

## 2024-03-13 VITALS
BODY MASS INDEX: 35 KG/M2 | WEIGHT: 185.38 LBS | SYSTOLIC BLOOD PRESSURE: 136 MMHG | HEIGHT: 61 IN | DIASTOLIC BLOOD PRESSURE: 78 MMHG | HEART RATE: 80 BPM | RESPIRATION RATE: 16 BRPM

## 2024-03-13 DIAGNOSIS — E78.2 MIXED HYPERLIPIDEMIA: ICD-10-CM

## 2024-03-13 DIAGNOSIS — E66.01 SEVERE OBESITY (BMI 35.0-35.9 WITH COMORBIDITY) (HCC): ICD-10-CM

## 2024-03-13 DIAGNOSIS — E03.9 ACQUIRED HYPOTHYROIDISM: ICD-10-CM

## 2024-03-13 DIAGNOSIS — I10 ESSENTIAL HYPERTENSION: Chronic | ICD-10-CM

## 2024-03-13 DIAGNOSIS — E11.65 TYPE 2 DIABETES MELLITUS WITH HYPERGLYCEMIA, WITHOUT LONG-TERM CURRENT USE OF INSULIN (HCC): Primary | ICD-10-CM

## 2024-03-13 DIAGNOSIS — G35 MULTIPLE SCLEROSIS (HCC): Chronic | ICD-10-CM

## 2024-03-13 PROCEDURE — 99214 OFFICE O/P EST MOD 30 MIN: CPT | Performed by: FAMILY MEDICINE

## 2024-03-13 RX ORDER — LOSARTAN POTASSIUM 25 MG/1
25 TABLET ORAL DAILY
Qty: 90 TABLET | Refills: 3 | Status: SHIPPED | OUTPATIENT
Start: 2024-03-13

## 2024-03-13 RX ORDER — BLOOD SUGAR DIAGNOSTIC
STRIP MISCELLANEOUS
Qty: 300 STRIP | Refills: 3 | Status: SHIPPED | OUTPATIENT
Start: 2024-03-13 | End: 2025-03-13

## 2024-03-13 RX ORDER — ATORVASTATIN CALCIUM 20 MG/1
20 TABLET, FILM COATED ORAL DAILY
Qty: 90 TABLET | Refills: 3 | Status: SHIPPED | OUTPATIENT
Start: 2024-03-13 | End: 2025-03-08

## 2024-03-13 NOTE — PROGRESS NOTES
had concerns including Blood Pressure (6 months follow up /).   Sharri Montgomery is a 68 year old female who presents for recheck of her diabetes.  Subjective:   Graddaughter sold candy and her sugars wehnt up. Poor sugar control  Last A1c value was 8.2% done 3/2/2024.     Health Maintenance Due   Topic Date Due    Zoster Vaccines (1 of 2) Never done    Pap Smear  08/21/2023    COVID-19 Vaccine (1 - 2023-24 season) Never done    Influenza Vaccine (1) 10/01/2023    Annual Depression Screening  01/01/2024    Fall Risk Screening (Annual)  01/01/2024        Objective:    Labs, meds and PMSFHx reviewed, see Reviewed tab in Encounter     Wt Readings from Last 3 Encounters:   03/13/24 185 lb 6.4 oz (84.1 kg)   09/15/23 185 lb 3.2 oz (84 kg)   06/16/23 188 lb 9.6 oz (85.5 kg)     BP Readings from Last 3 Encounters:   03/13/24 136/78   09/15/23 110/80   06/16/23 126/70         HPI     Review of Systems   Constitutional: Negative.  Negative for activity change, appetite change, chills and fever.   HENT: Negative.     Eyes: Negative.    Respiratory: Negative.  Negative for shortness of breath.    Cardiovascular: Negative.  Negative for chest pain and palpitations.   Gastrointestinal: Negative.  Negative for abdominal pain.   Genitourinary: Negative.  Negative for dysuria.   Musculoskeletal:  Negative for arthralgias.   Skin: Negative.  Negative for rash.   Allergic/Immunologic: Negative.    Neurological: Negative.         /78   Pulse 80   Resp 16   Ht 5' 1\" (1.549 m)   Wt 185 lb 6.4 oz (84.1 kg)   LMP  (LMP Unknown)   BMI 35.03 kg/m²  Estimated body mass index is 35.03 kg/m² as calculated from the following:    Height as of this encounter: 5' 1\" (1.549 m).    Weight as of this encounter: 185 lb 6.4 oz (84.1 kg).   Physical Exam  Vitals and nursing note reviewed.   Constitutional:       General: She is not in acute distress.     Appearance: Normal appearance.   HENT:      Head: Normocephalic.   Cardiovascular:       Rate and Rhythm: Normal rate and regular rhythm.      Pulses:           Posterior tibial pulses are 2+ on the right side and 2+ on the left side.      Heart sounds: Normal heart sounds. No murmur heard.  Pulmonary:      Effort: Pulmonary effort is normal. No respiratory distress.      Breath sounds: Normal breath sounds. No wheezing.   Abdominal:      General: Bowel sounds are normal.      Palpations: Abdomen is soft.      Tenderness: There is no abdominal tenderness.   Musculoskeletal:      Cervical back: Normal range of motion.      Right lower leg: No edema.      Left lower leg: No edema.      Right foot: No deformity.      Left foot: No deformity.   Feet:      Right foot:      Protective Sensation: 6 sites tested.  6 sites sensed.      Skin integrity: Skin integrity normal. No ulcer.      Left foot:      Protective Sensation: 6 sites tested.  6 sites sensed.      Skin integrity: Skin integrity normal. No ulcer.   Skin:     General: Skin is warm and dry.      Findings: No rash.   Neurological:      Mental Status: She is alert and oriented to person, place, and time.   Psychiatric:         Mood and Affect: Mood normal.         Behavior: Behavior normal.         Thought Content: Thought content normal.         Judgment: Judgment normal.      Bilateral barefoot skin diabetic exam is normal, visualized feet and the appearance is normal.  Bilateral monofilament/sensation of both feet is normal.  Pulsation pedal pulse exam of both lower legs/feet is normal as well.       Nursing note and vitals reviewed.       Assessment & Plan:    The patient indicates understanding of these issues and agrees to the plan.    1. Type 2 diabetes mellitus with hyperglycemia, without long-term current use of insulin (HCC) (Primary)  Overview:  Dx 2013, Metformin 1000 daily BID (increased 2/19/2020 )  Assessment & Plan:  A1c is 8.2 done 3/2/2024 which shows frequent hyperglycemia and poor control! Encouraged better dietary choices and  portion control, and increased activity and med changes as listed.  Diabetic medications include metFORMIN  MG Oral Tablet 24 Hr [[id number]], metFORMIN  MG Oral Tablet 24 Hr [[id number]] (Long-Term Med).   Orders:  -     Accu-Chek Guide; TID checking  Dispense: 300 strip; Refill: 3  2. Severe obesity (BMI 35.0-35.9 with comorbidity) ([facility])  Overview:  BMI 37  Assessment & Plan:  Worling on weight loss. Continue present management   3. Multiple sclerosis ([facility])  Overview:  No meds, .Neurolofy Dr Perez in past, but stable > 5 years. Last MRI 2018. Dr Luu managing as of 2019.   Assessment & Plan:  Stable, continue present management   4. Mixed hyperlipidemia  Overview:  Atorvastatin 10  Assessment & Plan:  Cholesterol shows Good control. Long term heart-healthy diet and lifestyle discussed and encouraged to reduce risk of cardiovascular disease.  Cholesterol: 147, done on 3/2/2024.  HDL Cholesterol: 70, done on 3/2/2024.  TriGlycerides 115, done on 3/2/2024.  LDL Cholesterol: 57, done on 3/2/2024.   Cholesterol medications include atorvastatin 20 MG Oral Tab [[id number]], atorvastatin 20 MG Oral Tab [[id number]] (Long-Term Med).     Orders:  -     Atorvastatin Calcium; Take 1 tablet (20 mg total) by mouth daily.  Dispense: 90 tablet; Refill: 3  5. Essential hypertension  Overview:  Lisinopril 5- not really BP issue, but DM  Assessment & Plan:  Last K was 3.7 done on 3/2/2024.  Last Cr was 0.57 done on 3/2/2024.  Last eGFR was 100 on 3/2/2024.  BP Meds: losartan Tabs - 25 MG    Orders:  -     Losartan Potassium; Take 1 tablet (25 mg total) by mouth daily.  Dispense: 90 tablet; Refill: 3  6. Acquired hypothyroidism  Overview:  Levothyroxine 75 (up from 50 8/17/2016)  Assessment & Plan:  Stable, continue present management   Worsening diabetes without specific cause, work on diet exercise, [employer] check machine is given, given 3 months with diet and exercise to see if there is an improvement as it is not clear what is  changed as her weight has been steady and reassess in 3 months.  She also has a large lesion, bullous lesion in the gluteal area for which we could do a shave biopsy in the summer    I am having Sharri Montgomery start on Accu-Chek Guide. I am also having her maintain her B Complex Vitamins (B COMPLEX 1 OR), Vitamin D, Accu-Chek FastClix Lancets, Accu-Chek SmartView, levothyroxine, metFORMIN ER, atorvastatin, and losartan.     Return in about 3 months (around 6/13/2024) for 3 months diabetes follow up, also 15 minute shave bx before then .

## 2024-06-13 ENCOUNTER — OFFICE VISIT (OUTPATIENT)
Dept: FAMILY MEDICINE CLINIC | Facility: CLINIC | Age: 69
End: 2024-06-13
Payer: MEDICARE

## 2024-06-13 VITALS
DIASTOLIC BLOOD PRESSURE: 80 MMHG | HEART RATE: 76 BPM | SYSTOLIC BLOOD PRESSURE: 128 MMHG | HEIGHT: 61 IN | RESPIRATION RATE: 14 BRPM | WEIGHT: 181.81 LBS | BODY MASS INDEX: 34.33 KG/M2

## 2024-06-13 DIAGNOSIS — D22.9 CHANGE IN MOLE: ICD-10-CM

## 2024-06-13 DIAGNOSIS — E11.65 TYPE 2 DIABETES MELLITUS WITH HYPERGLYCEMIA, WITHOUT LONG-TERM CURRENT USE OF INSULIN (HCC): Primary | ICD-10-CM

## 2024-06-13 DIAGNOSIS — I10 ESSENTIAL HYPERTENSION: Chronic | ICD-10-CM

## 2024-06-13 DIAGNOSIS — E66.01 SEVERE OBESITY (BMI 35.0-35.9 WITH COMORBIDITY) (HCC): ICD-10-CM

## 2024-06-13 DIAGNOSIS — E03.9 ACQUIRED HYPOTHYROIDISM: ICD-10-CM

## 2024-06-13 DIAGNOSIS — E78.2 MIXED HYPERLIPIDEMIA: ICD-10-CM

## 2024-06-13 LAB — HEMOGLOBIN A1C: 7.2 % (ref 4.3–5.6)

## 2024-06-13 PROCEDURE — 99214 OFFICE O/P EST MOD 30 MIN: CPT | Performed by: FAMILY MEDICINE

## 2024-06-13 PROCEDURE — 11302 SHAVE SKIN LESION 1.1-2.0 CM: CPT | Performed by: FAMILY MEDICINE

## 2024-06-13 PROCEDURE — 88305 TISSUE EXAM BY PATHOLOGIST: CPT | Performed by: FAMILY MEDICINE

## 2024-06-13 PROCEDURE — 83036 HEMOGLOBIN GLYCOSYLATED A1C: CPT | Performed by: FAMILY MEDICINE

## 2024-06-13 RX ORDER — LANCETS
EACH MISCELLANEOUS
Qty: 100 EACH | Refills: 3 | Status: SHIPPED | OUTPATIENT
Start: 2024-06-13

## 2024-06-13 NOTE — ASSESSMENT & PLAN NOTE
Cholesterol shows Good control. Long term heart-healthy diet and lifestyle discussed and encouraged to reduce risk of cardiovascular disease.  Cholesterol: 147, done on 3/2/2024.  HDL Cholesterol: 70, done on 3/2/2024.  TriGlycerides 115, done on 3/2/2024.  LDL Cholesterol: 57, done on 3/2/2024.   Cholesterol medications include atorvastatin 20 MG Oral Tab [[id number]], atorvastatin 20 MG Oral Tab [[id number]] (Long-Term Med).

## 2024-06-13 NOTE — ASSESSMENT & PLAN NOTE
Thyroid shows Good control.   TSH: 0.902, done on 3/2/2024.  FT4: 1.1, done on 3/2/2024.   Thyroid therapy includes levothyroxine 75 MCG Oral Tab [[id number]], levothyroxine 75 MCG Oral Tab [[id number]] (Long-Term Med).

## 2024-06-13 NOTE — ASSESSMENT & PLAN NOTE
A1c is 8.2 done 3/2/2024 which shows frequent hyperglycemia and poor control! Encouraged better dietary choices and portion control, and increased activity and med changes as listed.  Diabetic medications include metFORMIN  MG Oral Tablet 24 Hr [[id number]], metFORMIN  MG Oral Tablet 24 Hr [[id number]] (Long-Term Med).

## 2024-06-13 NOTE — ASSESSMENT & PLAN NOTE
Last K was 3.7 done on 3/2/2024.  Last Cr was 0.57 done on 3/2/2024.  Last eGFR was 100 on 3/2/2024.  BP Meds: losartan Tabs - 25 MG     Anesthesia Type: 2% lidocaine with epinephrine

## 2024-06-13 NOTE — PROGRESS NOTES
had concerns including Diabetes (Follow up ) and Growth (Has a growth that needs to be shaven off ).   Sharri Montgomery is a 69 year old female who presents for recheck of her diabetes.  Subjective:   Stable blood sugars, good function. Better weight   Last A1c value was 7.2% done 6/13/2024.     Health Maintenance Due   Topic Date Due    Zoster Vaccines (1 of 2) Never done    Pap Smear  08/21/2023    COVID-19 Vaccine (1 - 2023-24 season) Never done    Annual Depression Screening  01/01/2024    Fall Risk Screening (Annual)  01/01/2024    Diabetes Care Dilated Eye Exam  08/17/2024        Objective:    Labs, meds and PMSFHx reviewed, see Reviewed tab in Encounter     Wt Readings from Last 3 Encounters:   06/13/24 181 lb 12.8 oz (82.5 kg)   03/13/24 185 lb 6.4 oz (84.1 kg)   09/15/23 185 lb 3.2 oz (84 kg)     BP Readings from Last 3 Encounters:   06/13/24 128/80   03/13/24 136/78   09/15/23 110/80         HPI     Review of Systems   Constitutional: Negative.  Negative for activity change, appetite change, chills and fever.   HENT: Negative.     Eyes: Negative.    Respiratory: Negative.  Negative for shortness of breath.    Cardiovascular: Negative.  Negative for chest pain and palpitations.   Gastrointestinal: Negative.  Negative for abdominal pain.   Genitourinary: Negative.  Negative for dysuria.   Musculoskeletal:  Negative for arthralgias.   Skin: Negative.  Negative for rash.   Allergic/Immunologic: Negative.    Neurological: Negative.         /80   Pulse 76   Resp 14   Ht 5' 1\" (1.549 m)   Wt 181 lb 12.8 oz (82.5 kg)   LMP  (LMP Unknown)   BMI 34.35 kg/m²  Estimated body mass index is 34.35 kg/m² as calculated from the following:    Height as of this encounter: 5' 1\" (1.549 m).    Weight as of this encounter: 181 lb 12.8 oz (82.5 kg).   Physical Exam  Vitals and nursing note reviewed.   Constitutional:       General: She is not in acute distress.     Appearance: Normal appearance.   HENT:      Head:  Normocephalic.   Cardiovascular:      Rate and Rhythm: Normal rate and regular rhythm.      Pulses:           Posterior tibial pulses are 2+ on the right side and 2+ on the left side.      Heart sounds: Normal heart sounds. No murmur heard.  Pulmonary:      Effort: Pulmonary effort is normal. No respiratory distress.      Breath sounds: Normal breath sounds. No wheezing.   Abdominal:      General: Bowel sounds are normal.      Palpations: Abdomen is soft.      Tenderness: There is no abdominal tenderness.   Musculoskeletal:      Cervical back: Normal range of motion.      Right lower leg: No edema.      Left lower leg: No edema.      Right foot: No deformity.      Left foot: No deformity.   Feet:      Right foot:      Protective Sensation: 6 sites tested.  6 sites sensed.      Skin integrity: Skin integrity normal. No ulcer.      Left foot:      Protective Sensation: 6 sites tested.  6 sites sensed.      Skin integrity: Skin integrity normal. No ulcer.   Skin:     General: Skin is warm and dry.      Findings: No rash.   Neurological:      Mental Status: She is alert and oriented to person, place, and time.   Psychiatric:         Mood and Affect: Mood normal.         Behavior: Behavior normal.         Thought Content: Thought content normal.         Judgment: Judgment normal.        Nursing note and vitals reviewed.       Assessment & Plan:    The patient indicates understanding of these issues and agrees to the plan.    1. Type 2 diabetes mellitus with hyperglycemia, without long-term current use of insulin (HCC) (Primary)  Overview:  Dx 2013, Metformin 1000 daily BID (increased 2/19/2020 )  Assessment & Plan:  A1c is 8.2 done 3/2/2024 which shows frequent hyperglycemia and poor control! Encouraged better dietary choices and portion control, and increased activity and med changes as listed.  Diabetic medications include metFORMIN  MG Oral Tablet 24 Hr [[id number]], metFORMIN  MG Oral Tablet 24 Hr  [[id number]] (Long-Term Med).   Orders:  -     POC Hgb A1C  -     Accu-Chek Softclix Lancets; Daily glucose check  Dispense: 100 each; Refill: 3  2. Severe obesity (BMI 35.0-35.9 with comorbidity) ([facility])  Overview:  BMI 37  Assessment & Plan:  Working on weight loss. Continue to monitor and continue present management   3. Essential hypertension  Overview:  Lisinopril 5- not really BP issue, but DM  Assessment & Plan:  Last K was 3.7 done on 3/2/2024.  Last Cr was 0.57 done on 3/2/2024.  Last eGFR was 100 on 3/2/2024.  BP Meds: losartan Tabs - 25 MG    4. Mixed hyperlipidemia  Overview:  Atorvastatin 10  Assessment & Plan:  Cholesterol shows Good control. Long term heart-healthy diet and lifestyle discussed and encouraged to reduce risk of cardiovascular disease.  Cholesterol: 147, done on 3/2/2024.  HDL Cholesterol: 70, done on 3/2/2024.  TriGlycerides 115, done on 3/2/2024.  LDL Cholesterol: 57, done on 3/2/2024.   Cholesterol medications include atorvastatin 20 MG Oral Tab [[id number]], atorvastatin 20 MG Oral Tab [[id number]] (Long-Term Med).     5. Acquired hypothyroidism  Overview:  Levothyroxine 75 (up from 50 8/17/2016)  Assessment & Plan:  Thyroid shows Good control.   TSH: 0.902, done on 3/2/2024.  FT4: 1.1, done on 3/2/2024.   Thyroid therapy includes levothyroxine 75 MCG Oral Tab [[id number]], levothyroxine 75 MCG Oral Tab [[id number]] (Long-Term Med).   6. Change in mole  -     Specimen to Pathology Tissue; Future; Expected date: 06/13/2024  -     Specimen to Pathology Tissue  Other orders  -     Skin excision     I am having Sharri Montgomery start on Accu-Chek Softclix Lancets. I am also having her maintain her B Complex Vitamins (B COMPLEX 1 OR), Vitamin D, Accu-Chek FastClix Lancets, Accu-Chek SmartView, levothyroxine, metFORMIN ER, Accu-Chek Guide, atorvastatin, and losartan.     Return in about 6 months (around 12/13/2024) for recheck.    Skin excision    Date/Time: 6/13/2024 8:16 PM    Performed by: Jus  MD Gurdeep  Authorized by: Gurdeep uLu MD    Number of Lesions: 1  Lesion 1:     Body area: lower extremity    Lower extremity location: R buttock    Initial size (mm): 12    Final defect size (mm): 1    Malignancy: benign lesion      Surgical defect: 4 mm size    Repair comments: Wound prepped in sterile mannor, 1 ml lido without epi injected. Shave instrument used and large leasion removed. Extra tissure as well. Monsels used to for hemostatis and toelrated well. Lesion looked like a solid soft tissue mass like a neurofibroma or other fibrolipoma. Path sent.

## 2024-08-05 DIAGNOSIS — E03.9 ACQUIRED HYPOTHYROIDISM: ICD-10-CM

## 2024-08-06 RX ORDER — LEVOTHYROXINE SODIUM 0.07 MG/1
75 TABLET ORAL DAILY
Qty: 90 TABLET | Refills: 0 | Status: SHIPPED | OUTPATIENT
Start: 2024-08-06

## 2024-08-06 NOTE — TELEPHONE ENCOUNTER
Requested Prescriptions     Pending Prescriptions Disp Refills    levothyroxine 75 MCG Oral Tab 90 tablet 3     Sig: Take 1 tablet (75 mcg total) by mouth daily.     LOV 6/13/2024     Patient was asked to follow-up in: 6 months    Appointment scheduled: 9/20/2024 Gurdeep Luu MD     Medication refilled per protocol.

## 2024-09-10 ENCOUNTER — TELEPHONE (OUTPATIENT)
Dept: FAMILY MEDICINE CLINIC | Facility: CLINIC | Age: 69
End: 2024-09-10

## 2024-09-10 NOTE — TELEPHONE ENCOUNTER
Chart reviewed, OK for Paxlovid, please inform patient to hold atorvastatin for entire time on Paxlovid and 3 complete days after finishing Paxlovid.

## 2024-09-10 NOTE — TELEPHONE ENCOUNTER
Called and talke jc anders and she is not going to  the paxlovid because it is $1500 and she has not met her deductible

## 2024-09-10 NOTE — TELEPHONE ENCOUNTER
Patient call requesting speak to a nurse for a Covid test positive last night .    Sore throat, chills, headache, congestion .cough.    Request a nurse to call back.

## 2024-09-10 NOTE — TELEPHONE ENCOUNTER
Called and talked to patient symptoms started Sunday has fever and congestion breathless on the phone also has cough had positive for covid sent information on covid medication and the new isolation protocol will ask if someone will send in paxlovid

## 2024-09-19 NOTE — ASSESSMENT & PLAN NOTE
Cholesterol shows Good control. Long term heart-healthy diet and lifestyle discussed and encouraged to reduce risk of cardiovascular disease.  3/2/2024: Cholesterol, Total 147; HDL Cholesterol 70 (H); Triglycerides 115; LDL Cholesterol 57  Cholesterol Meds: atorvastatin Tabs - 20 MG  stable  Continue with current treatment plan

## 2024-09-19 NOTE — ASSESSMENT & PLAN NOTE
BP shows borderline control with last BP of 128/80. Work on lifestyle changes, diet, exercise and weight management.   3/2/2024: Potassium 3.7; Creatinine 0.57; eGFR-Cr 100  BP Meds: losartan Tabs - 25 MG   ACE/ARB Adherence Poor at 25% working on improving BP use

## 2024-09-19 NOTE — ASSESSMENT & PLAN NOTE
Last BMI: 34.35, Class 1 Obesity.   Last BMI: 34.35, Overweight.  She was counseled on diet and exercise for elevated BMI which is affecting her Diabetes, hypertension, hyperlipidemia, and osteoarthritis.

## 2024-09-19 NOTE — ASSESSMENT & PLAN NOTE
Diabetes: A1c is 7.2 done 6/13/2024 which shows hyperglycemia and borderline control, maintain vigilance and increase activity and medications as listed.   DM Meds: metFORMIN ER Tb24 - 500 MG   Oral Diabetes Med Adherence Fair at 87%, working on better treatment plan   Diabetic Complications: Hyperglycemia: 7.2% 6/13/2024, .  MicroAlbuminuria: 32.5 mg/dL  Proteinuria: 32.5 mg/dL  Cataract history   Diabetes is : unchanged Continue current treatment regimen. Reassess Diabetes in : in 6 months

## 2024-09-19 NOTE — ASSESSMENT & PLAN NOTE
Thyroid shows Good control.   3/2/2024: TSH 0.902  Thryoid Meds: levothyroxine Tabs - 75 MCG well controlled current treatment plan effective, no change in therapy

## 2024-09-20 ENCOUNTER — OFFICE VISIT (OUTPATIENT)
Dept: FAMILY MEDICINE CLINIC | Facility: CLINIC | Age: 69
End: 2024-09-20
Payer: MEDICARE

## 2024-09-20 VITALS
WEIGHT: 182.19 LBS | HEIGHT: 61 IN | BODY MASS INDEX: 34.4 KG/M2 | SYSTOLIC BLOOD PRESSURE: 136 MMHG | RESPIRATION RATE: 14 BRPM | DIASTOLIC BLOOD PRESSURE: 80 MMHG | HEART RATE: 82 BPM

## 2024-09-20 DIAGNOSIS — I10 ESSENTIAL HYPERTENSION: Chronic | ICD-10-CM

## 2024-09-20 DIAGNOSIS — E03.9 ACQUIRED HYPOTHYROIDISM: ICD-10-CM

## 2024-09-20 DIAGNOSIS — E78.2 MIXED HYPERLIPIDEMIA: ICD-10-CM

## 2024-09-20 DIAGNOSIS — Z00.00 ANNUAL PHYSICAL EXAM: Primary | ICD-10-CM

## 2024-09-20 DIAGNOSIS — E11.65 TYPE 2 DIABETES MELLITUS WITH HYPERGLYCEMIA, WITHOUT LONG-TERM CURRENT USE OF INSULIN (HCC): ICD-10-CM

## 2024-09-20 DIAGNOSIS — Z00.00 ENCOUNTER FOR ANNUAL HEALTH EXAMINATION: ICD-10-CM

## 2024-09-20 DIAGNOSIS — Z12.11 SCREENING FOR COLON CANCER: ICD-10-CM

## 2024-09-20 DIAGNOSIS — Z12.31 VISIT FOR SCREENING MAMMOGRAM: ICD-10-CM

## 2024-09-20 DIAGNOSIS — M77.11 RIGHT LATERAL EPICONDYLITIS: ICD-10-CM

## 2024-09-20 DIAGNOSIS — G35 MULTIPLE SCLEROSIS (HCC): Chronic | ICD-10-CM

## 2024-09-20 DIAGNOSIS — E66.01 SEVERE OBESITY (BMI 35.0-35.9 WITH COMORBIDITY) (HCC): ICD-10-CM

## 2024-09-20 LAB — HEMOGLOBIN A1C: 7.2 % (ref 4.3–5.6)

## 2024-09-20 RX ORDER — METFORMIN HCL 500 MG
1500 TABLET, EXTENDED RELEASE 24 HR ORAL
Qty: 270 TABLET | Refills: 3 | Status: SHIPPED | OUTPATIENT
Start: 2024-09-20

## 2024-09-20 RX ORDER — LEVOTHYROXINE SODIUM 75 UG/1
75 TABLET ORAL DAILY
Qty: 90 TABLET | Refills: 0 | Status: SHIPPED | OUTPATIENT
Start: 2024-09-20

## 2024-09-20 NOTE — PATIENT INSTRUCTIONS
Sharri Montgomery's SCREENING SCHEDULE   Tests on this list are recommended by your physician but may not be covered, or covered at this frequency, by your insurer.   Please check with your insurance carrier before scheduling to verify coverage.   PREVENTATIVE SERVICES FREQUENCY &  COVERAGE DETAILS LAST COMPLETION DATE   Diabetes Screening    Fasting Blood Sugar /  Glucose    One screening every 12 months if never tested or if previously tested but not diagnosed with pre-diabetes   One screening every 6 months if diagnosed with pre-diabetes Lab Results   Component Value Date     (H) 03/02/2024        Cardiovascular Disease Screening    Lipid Panel  Cholesterol  Lipoprotein (HDL)  Triglycerides Covered every 5 years for all Medicare beneficiaries without apparent signs or symptoms of cardiovascular disease Lab Results   Component Value Date    CHOLEST 147 03/02/2024    HDL 70 (H) 03/02/2024    LDL 57 03/02/2024    TRIG 115 03/02/2024         Electrocardiogram (EKG)   Covered if needed at Welcome to Medicare, and non-screening if indicated for medical reasons 10/13/2022      Ultrasound Screening for Abdominal Aortic Aneurysm (AAA) Covered once in a lifetime for one of the following risk factors   • Men who are 65-75 years old and have ever smoked   • Anyone with a family history -     Colorectal Cancer Screening  Covered for ages 50-85; only need ONE of the following:    Colonoscopy   Covered every 10 years    Covered every 2 years if patient is at high risk or previous colonoscopy was abnormal -    Health Maintenance   Topic Date Due   • Colorectal Cancer Screening  11/13/2024       Flexible Sigmoidoscopy   Covered every 4 years -    Fecal Occult Blood Test Covered annually 11/13/2023   Bone Density Screening    Bone density screening    Covered every 2 years after age 65 if diagnosed with risk of osteoporosis or estrogen deficiency.    Covered yearly for long-term glucocorticoid medication use (Steroids) Last  Dexa Scan:    XR DEXA BONE DENSITOMETRY (CPT=77080) 03/01/2023      No recommendations at this time   Pap and Pelvic    Pap   Covered every 2 years for women at normal risk; Annually if at high risk 08/21/2020  No recommendations at this time    Chlamydia Annually if high risk 11/13/2023  No recommendations at this time   Screening Mammogram    Mammogram     Recommend annually for all female patients aged 40 and older    One baseline mammogram covered for patients aged 35-39 03/02/2024    Health Maintenance   Topic Date Due   • Mammogram  03/02/2025       Immunizations    Influenza Covered once per flu season  Please get every year -  No recommendations at this time    Pneumococcal Each vaccine ([id number] & [id number]) covered once after 65 Prevnar 13: -    [id number]: 02/14/2018     No recommendations at this time    Hepatitis B One screening covered for patients with certain risk factors   -  No recommendations at this time    Tetanus Toxoid Not covered by Medicare Part B unless medically necessary (cut with metal); may be covered with your pharmacy prescription benefits -    Tetanus, Diptheria and Pertusis TD and TDaP Not covered by Medicare Part B -  No recommendations at this time    Zoster Not covered by Medicare Part B; may be covered with your pharmacy  prescription benefits -  Zoster Vaccines(1 of 2) Never done     Diabetes      Hemoglobin A1C Annually; if last result is elevated, may be asked to retest more frequently.    Medicare covers every 3 months Lab Results   Component Value Date     (H) 03/02/2024    A1C 7.2 (A) 09/20/2024       No recommendations at this time    Creat/alb ratio Annually Lab Results   Component Value Date    MICROALBCREA 32.5 (H) 03/02/2024       LDL Annually Lab Results   Component Value Date    LDL 57 03/02/2024       Dilated Eye Exam Annually [unfilled]     Annual Monitoring of Persistent Medications (ACE/ARB, digoxin diuretics, anticonvulsants)    Potassium  Annually Lab Results   Component Value Date    K 3.7 03/02/2024         Creatinine   Annually Lab Results   Component Value Date    CREATSERUM 0.57 03/02/2024         BUN Annually Lab Results   Component Value Date    BUN 11 03/02/2024       Drug Serum Conc Annually No results found for: \"DIGOXIN\", \"DIG\", \"VALP\"

## 2024-09-20 NOTE — PROGRESS NOTES
Subjective:   Sharri Montgomery is a 69 year old female who presents for a Subsequent Annual Wellness visit (Pt already had Initial Annual Wellness) and scheduled follow up of multiple significant but stable problems.   Doing well, just had COVID last week, but otherwise doing well. BS up this last week.     History/Other:   Fall Risk Assessment:   She has been screened for Falls and is low risk.      Cognitive Assessment:   She had a completely normal cognitive assessment - see flowsheet entries     Functional Ability/Status:   Sharri Montgomery has a completely normal functional assessment. See flowsheet for details.  Last A1c value was 7.2% done 9/20/2024.     Depression Screening (PHQ):  PHQ-2 SCORE: 0  , done 9/20/2024          Advanced Directives:   She does NOT have a Living Will. [ ]  She does NOT have a Power of  for Health Care. [ ]  Discussed Advance Care Planning with patient (and family/surrogate if present). Standard forms made available to patient in After Visit Summary.      Patient Active Problem List   Diagnosis    Multiple sclerosis ([facility])    Metabolic syndrome    Urinary incontinence    Acquired hypothyroidism    Osteoarthrosis involving lower leg    Type 2 diabetes mellitus with hyperglycemia, without long-term current use of insulin ([facility])    Mixed hyperlipidemia    Essential hypertension    Severe obesity (BMI 35.0-35.9 with comorbidity) ([facility])     Allergies:  She has No Known Allergies.    Current Medications:  Outpatient Medications Marked as Taking for the 9/20/24 encounter (Office Visit) with Gurdeep Luu MD   Medication Sig    levothyroxine 75 MCG Oral Tab Take 1 tablet (75 mcg total) by mouth daily.    metFORMIN  MG Oral Tablet 24 Hr Take 3 tablets (1,500 mg total) by mouth daily with breakfast.    Glucose Blood (ACCU-CHEK GUIDE) In Vitro Strip TID checking    atorvastatin 20 MG Oral Tab Take 1 tablet (20 mg total) by mouth daily.    losartan 25 MG Oral Tab Take 1 tablet (25 mg  total) by mouth daily.    Glucose Blood (ACCU-CHEK SMARTVIEW) In Vitro Strip USE ONE STRIP TO CHECK GLUCOSE ONCE DAILY    Cholecalciferol (VITAMIN D) 1000 UNITS Oral Cap Take 5,000 mg by mouth.    B Complex Vitamins (B COMPLEX 1 OR) Take by mouth daily.         Medical History:  She  has a past medical history of Cataract (5/15/2014), Cervical dysplasia, Diabetes (HCC) (May 2015), Essential hypertension, Hyperlipidemia, Hypothyroidism, and Osteoarthritis.  Surgical History:  She  has a past surgical history that includes appendectomy (1973); mri breast bilateral (2012); alirio localization wire 1 site right (cpt=19281) (); colonoscopy; ; other surgical history (1986); and cataract (126527).   Family History:  Her family history includes Arthritis in her brother, brother and another family member; Breast Cancer (age of onset: 60) in her maternal grandfather and paternal grandmother; Breast Cancer (age of onset: 70) in her maternal aunt and maternal aunt; Breast Cancer (age of onset: 79) in her mother; Cancer in her maternal grandfather, maternal grandfather, maternal grandmother, mother, and paternal grandmother; Diabetes in her father, mother, and sister; Heart Surgery in her mother; High Cholesterol in her daughter; Hypertension in her son; Other in her father and mother; Ovarian Cancer (age of onset: 25) in her maternal cousin female; Stroke in her son; Thyroid Disorder in her daughter and sister.  Social History:  She  reports that she quit smoking about 42 years ago. Her smoking use included cigarettes. She has never used smokeless tobacco. She reports that she does not currently use alcohol. She reports that she does not use drugs.    Tobacco:  She smoked tobacco in the past but quit greater than 12 months ago.  Social History     Tobacco Use   Smoking Status Former    Current packs/day: 0.00    Types: Cigarettes    Quit date: 1981    Years since quittin.8   Smokeless Tobacco  Never   Tobacco Comments    quit 1980        CAGE Alcohol Screen:   CAGE screening score of 0 on 9/20/2024, showing low risk of alcohol abuse.      Patient Care Team:  Gurdeep Luu MD as PCP - General (Family Practice)  Jewel Perez as Consulting Physician (NEUROLOGY)  Levy Fung MD (OPHTHALMOLOGY)  Jeimy Hope, RD,LDN,CDE (Dietitian)    Review of Systems   Constitutional: Negative.  Negative for fatigue, fever and unexpected weight change.   HENT: Negative.     Eyes: Negative.    Respiratory: Negative.     Cardiovascular: Negative.    Gastrointestinal: Negative.  Negative for nausea and vomiting.   Endocrine: Negative.  Negative for polydipsia, polyphagia and polyuria.   Genitourinary: Negative.    Musculoskeletal: Negative.  Negative for neck pain.   Skin: Negative.    Neurological: Negative.    Psychiatric/Behavioral: Negative.     All other systems reviewed and are negative.        Objective:   Physical Exam  Vitals and nursing note reviewed.   Constitutional:       General: She is not in acute distress.     Appearance: Normal appearance.   HENT:      Head: Normocephalic and atraumatic.      Right Ear: Tympanic membrane and external ear normal.      Left Ear: Tympanic membrane and external ear normal.      Nose: Nose normal.      Mouth/Throat:      Mouth: Mucous membranes are moist.   Eyes:      Extraocular Movements: Extraocular movements intact.      Pupils: Pupils are equal, round, and reactive to light.   Cardiovascular:      Rate and Rhythm: Normal rate and regular rhythm.      Pulses: Normal pulses.           Carotid pulses are 2+ on the right side and 2+ on the left side.       Radial pulses are 2+ on the right side and 2+ on the left side.        Dorsalis pedis pulses are 2+ on the right side and 2+ on the left side.        Posterior tibial pulses are 2+ on the right side and 2+ on the left side.      Heart sounds: Normal heart sounds, S1 normal and S2 normal. No murmur heard.  Pulmonary:       Effort: Pulmonary effort is normal. No respiratory distress.      Breath sounds: Normal breath sounds. No wheezing.   Abdominal:      General: Abdomen is flat. Bowel sounds are normal. There is no distension.      Palpations: Abdomen is soft.      Tenderness: There is no abdominal tenderness.   Musculoskeletal:         General: Normal range of motion.      Cervical back: Normal range of motion and neck supple.      Right lower leg: No edema.      Left lower leg: No edema.      Right foot: No deformity.      Left foot: No deformity.   Feet:      Right foot:      Protective Sensation: 6 sites tested.  6 sites sensed.      Skin integrity: Skin integrity normal. No ulcer.      Left foot:      Protective Sensation: 6 sites tested.  6 sites sensed.      Skin integrity: Skin integrity normal. No ulcer.   Skin:     General: Skin is warm and dry.      Capillary Refill: Capillary refill takes less than 2 seconds.      Findings: No rash.   Neurological:      General: No focal deficit present.      Mental Status: She is alert and oriented to person, place, and time.   Psychiatric:         Mood and Affect: Mood normal.         Behavior: Behavior normal.         Thought Content: Thought content normal.         Judgment: Judgment normal.           /80   Pulse 82   Resp 14   Ht 5' 1\" (1.549 m)   Wt 182 lb 3.2 oz (82.6 kg)   LMP  (LMP Unknown)   BMI 34.43 kg/m²  Estimated body mass index is 34.43 kg/m² as calculated from the following:    Height as of this encounter: 5' 1\" (1.549 m).    Weight as of this encounter: 182 lb 3.2 oz (82.6 kg).    Medicare Hearing Assessment:   Hearing Screening    Screening Method: Whisper Test  Whisper Test Result: Pass         Visual Acuity:   Right Eye Visual Acuity: Uncorrected Right Eye Chart Acuity: 20/20   Left Eye Visual Acuity: Uncorrected Left Eye Chart Acuity: 20/20   Both Eyes Visual Acuity: Uncorrected Both Eyes Chart Acuity: 20/20   Able To Tolerate Visual Acuity: Yes         Assessment & Plan:   Sharri Montgomery is a 69 year old female who presents for a Medicare Assessment.     1. Annual physical exam (Primary)  2. Type 2 diabetes mellitus with hyperglycemia, without long-term current use of insulin ([facility])  Overview:  Dx 2013, Metformin 1000 daily BID (increased 2/19/2020 )  Assessment & Plan:  Diabetes: A1c is 7.2 done 6/13/2024 which shows hyperglycemia and borderline control, maintain vigilance and increase activity and medications as listed.   DM Meds: metFORMIN ER Tb24 - 500 MG   Oral Diabetes Med Adherence Fair at 87%, working on better treatment plan   Diabetic Complications: Hyperglycemia: 7.2% 6/13/2024, .  MicroAlbuminuria: 32.5 mg/dL  Proteinuria: 32.5 mg/dL  Cataract history   Diabetes is : unchanged Continue current treatment regimen. Reassess Diabetes in : in 6 months   Orders:  -     POC Hemoglobin A1C  -     metFORMIN HCl ER; Take 3 tablets (1,500 mg total) by mouth daily with breakfast.  Dispense: 270 tablet; Refill: 3  -     Comp Metabolic Panel (14); Future; Expected date: 02/15/2025  -     Lipid Panel; Future; Expected date: 02/15/2025  -     Hemoglobin A1C; Future; Expected date: 02/15/2025  -     Microalb/Creat Ratio, Random Urine; Future; Expected date: 02/15/2025  -     Detailed, Mod Complex (85923)  3. Severe obesity (BMI 35.0-35.9 with comorbidity) ([facility])  Overview:  BMI 37  Assessment & Plan:  Last BMI: 34.35, Class 1 Obesity.   Last BMI: 34.35, Overweight.  She was counseled on diet and exercise for elevated BMI which is affecting her Diabetes, hypertension, hyperlipidemia, and osteoarthritis.   Orders:  -     Detailed, Mod Complex (53105)  4. Multiple sclerosis ([facility])  Overview:  No meds, .Neurolofy Dr Perez in past, but stable > 5 years. Last MRI 2018. Dr Luu managing as of 2019.   Assessment & Plan:  Stable, continue present management and continue to monitor for progression   Orders:  -     Detailed, Mod Complex (88153)  5. Mixed  hyperlipidemia  Overview:  Atorvastatin 10  Assessment & Plan:  Cholesterol shows Good control. Long term heart-healthy diet and lifestyle discussed and encouraged to reduce risk of cardiovascular disease.  3/2/2024: Cholesterol, Total 147; HDL Cholesterol 70 (H); Triglycerides 115; LDL Cholesterol 57  Cholesterol Meds: atorvastatin Tabs - 20 MG  stable  Continue with current treatment plan   Orders:  -     TSH W Reflex To Free T4; Future; Expected date: 02/15/2025  -     Detailed, Mod Complex (20793)  6. Essential hypertension  Overview:  Lisinopril 5- not really BP issue, but DM  Assessment & Plan:  BP shows borderline control with last BP of 128/80. Work on lifestyle changes, diet, exercise and weight management.   3/2/2024: Potassium 3.7; Creatinine 0.57; eGFR-Cr 100  BP Meds: losartan Tabs - 25 MG   ACE/ARB Adherence Poor at 25% working on improving BP use    Orders:  -     CBC With Differential With Platelet; Future; Expected date: 02/15/2025  -     Detailed, Mod Complex (84869)  7. Acquired hypothyroidism  Overview:  Levothyroxine 75 (up from 50 8/17/2016)  Assessment & Plan:  Thyroid shows Good control.   3/2/2024: TSH 0.902  Thryoid Meds: levothyroxine Tabs - 75 MCG well controlled current treatment plan effective, no change in therapy   Orders:  -     Levothyroxine Sodium; Take 1 tablet (75 mcg total) by mouth daily.  Dispense: 90 tablet; Refill: 0  -     TSH W Reflex To Free T4; Future; Expected date: 02/15/2025  -     Detailed, Mod Complex (96403)  8. Right lateral epicondylitis  -     Detailed, Mod Complex (86787)  9. Screening for colon cancer  -     Occult Blood, Fecal, FIT Immunoassay; Future; Expected date: 09/20/2024  10. Visit for screening mammogram  -     3D Mammogram Digital Screen, Bilateral (CPT=77067/44437); Future; Expected date: 03/01/2025  11. Encounter for annual health examination    The patient indicates understanding of these issues and agrees to the plan.  Reinforced healthy diet,  lifestyle, and exercise.      Return in 6 months (on 3/20/2025).     Gurdeep Luu MD, 9/20/2024     Supplementary Documentation:   General Health:  In the past six months, have you lost more than 10 pounds without trying?: 2 - No  Has your appetite been poor?: No  Type of Diet: Low Salt  How does the patient maintain a good energy level?: Other  How would you describe your daily physical activity?: Moderate  How would you describe your current health state?: Good  How do you maintain positive mental well-being?: Games;Visiting Friends;Visiting Family;Puzzles  On a scale of 0 to 10, with 0 being no pain and 10 being severe pain, what is your pain level?: 0 - (None)  In the past six months, have you experienced urine leakage?: 0-No  At any time do you feel concerned for the safety/well-being of yourself and/or your children, in your home or elsewhere?: No  Have you had any immunizations at another office such as Influenza, Hepatitis B, Tetanus, or Pneumococcal?: No    Health Maintenance   Topic Date Due    Zoster Vaccines (1 of 2) Never done    Pap Smear  08/21/2023    Annual Depression Screening  01/01/2024    Fall Risk Screening (Annual)  01/01/2024    COVID-19 Vaccine (1 - 2023-24 season) Never done    Annual Physical  09/15/2024    Colorectal Cancer Screening  11/13/2024    Influenza Vaccine (1) 10/01/2024    Diabetes Care A1C  12/13/2024    DEXA Scan  03/01/2025    Diabetes Care: GFR  03/02/2025    Diabetes Care: Microalb/Creat Ratio  03/02/2025    Mammogram  03/02/2025    Diabetes Care Foot Exam  06/13/2025    Diabetes Care Dilated Eye Exam  08/20/2025    Pneumococcal Vaccine: 65+ Years  Completed

## 2024-10-09 ENCOUNTER — PATIENT MESSAGE (OUTPATIENT)
Dept: FAMILY MEDICINE CLINIC | Facility: CLINIC | Age: 69
End: 2024-10-09

## 2024-10-09 DIAGNOSIS — E11.65 TYPE 2 DIABETES MELLITUS WITH HYPERGLYCEMIA, WITHOUT LONG-TERM CURRENT USE OF INSULIN (HCC): ICD-10-CM

## 2024-10-09 DIAGNOSIS — E78.2 MIXED HYPERLIPIDEMIA: ICD-10-CM

## 2024-10-09 RX ORDER — BLOOD SUGAR DIAGNOSTIC
STRIP MISCELLANEOUS
Qty: 300 STRIP | Refills: 3 | Status: SHIPPED | OUTPATIENT
Start: 2024-10-09 | End: 2025-10-09

## 2024-10-14 ENCOUNTER — TELEPHONE (OUTPATIENT)
Dept: FAMILY MEDICINE CLINIC | Facility: CLINIC | Age: 69
End: 2024-10-14

## 2024-10-14 NOTE — TELEPHONE ENCOUNTER
Dr. Luu could you please add to your notes from 9/20/24 as to why patient is testing BS tid.  This is required by [facility] for patient to continue to receive her supplies. Thank you.

## 2024-11-19 ENCOUNTER — MED REC SCAN ONLY (OUTPATIENT)
Dept: FAMILY MEDICINE CLINIC | Facility: CLINIC | Age: 69
End: 2024-11-19

## 2024-12-02 ENCOUNTER — LAB ENCOUNTER (OUTPATIENT)
Dept: LAB | Age: 69
End: 2024-12-02
Attending: FAMILY MEDICINE
Payer: MEDICARE

## 2024-12-02 DIAGNOSIS — Z12.11 SCREENING FOR COLON CANCER: ICD-10-CM

## 2024-12-02 LAB — HEMOCCULT STL QL: NEGATIVE

## 2024-12-02 PROCEDURE — 82274 ASSAY TEST FOR BLOOD FECAL: CPT

## 2025-01-14 DIAGNOSIS — E03.9 ACQUIRED HYPOTHYROIDISM: ICD-10-CM

## 2025-01-15 RX ORDER — LEVOTHYROXINE SODIUM 75 UG/1
75 TABLET ORAL DAILY
Qty: 90 TABLET | Refills: 0 | Status: SHIPPED | OUTPATIENT
Start: 2025-01-15

## 2025-03-05 ENCOUNTER — LAB ENCOUNTER (OUTPATIENT)
Dept: LAB | Age: 70
End: 2025-03-05
Attending: FAMILY MEDICINE
Payer: MEDICARE

## 2025-03-05 ENCOUNTER — HOSPITAL ENCOUNTER (OUTPATIENT)
Dept: MAMMOGRAPHY | Age: 70
Discharge: HOME | End: 2025-03-05
Attending: FAMILY MEDICINE
Payer: MEDICARE

## 2025-03-05 DIAGNOSIS — Z12.31 VISIT FOR SCREENING MAMMOGRAM: ICD-10-CM

## 2025-03-05 DIAGNOSIS — E11.65 TYPE 2 DIABETES MELLITUS WITH HYPERGLYCEMIA, WITHOUT LONG-TERM CURRENT USE OF INSULIN (HCC): ICD-10-CM

## 2025-03-05 DIAGNOSIS — E03.9 ACQUIRED HYPOTHYROIDISM: ICD-10-CM

## 2025-03-05 DIAGNOSIS — E78.2 MIXED HYPERLIPIDEMIA: ICD-10-CM

## 2025-03-05 DIAGNOSIS — I10 ESSENTIAL HYPERTENSION: Chronic | ICD-10-CM

## 2025-03-05 LAB
ALBUMIN SERPL-MCNC: 5 G/DL (ref 3.2–4.8)
ALBUMIN/GLOB SERPL: 2.5 {RATIO} (ref 1–2)
ALP LIVER SERPL-CCNC: 51 U/L
ALT SERPL-CCNC: 24 U/L
ANION GAP SERPL CALC-SCNC: 9 MMOL/L (ref 0–18)
AST SERPL-CCNC: 22 U/L (ref ?–34)
BASOPHILS # BLD AUTO: 0.07 X10(3) UL (ref 0–0.2)
BASOPHILS NFR BLD AUTO: 1.6 %
BILIRUB SERPL-MCNC: 0.8 MG/DL (ref 0.2–1.1)
BUN BLD-MCNC: 13 MG/DL (ref 9–23)
CALCIUM BLD-MCNC: 10.1 MG/DL (ref 8.7–10.6)
CHLORIDE SERPL-SCNC: 102 MMOL/L (ref 98–112)
CHOLEST SERPL-MCNC: 156 MG/DL (ref ?–200)
CO2 SERPL-SCNC: 29 MMOL/L (ref 21–32)
CREAT BLD-MCNC: 0.83 MG/DL
CREAT UR-SCNC: 82.9 MG/DL
EGFRCR SERPLBLD CKD-EPI 2021: 77 ML/MIN/1.73M2 (ref 60–?)
EOSINOPHIL # BLD AUTO: 0.2 X10(3) UL (ref 0–0.7)
EOSINOPHIL NFR BLD AUTO: 4.6 %
ERYTHROCYTE [DISTWIDTH] IN BLOOD BY AUTOMATED COUNT: 13 %
EST. AVERAGE GLUCOSE BLD GHB EST-MCNC: 177 MG/DL (ref 68–126)
FASTING PATIENT LIPID ANSWER: YES
FASTING STATUS PATIENT QL REPORTED: YES
GLOBULIN PLAS-MCNC: 2 G/DL (ref 2–3.5)
GLUCOSE BLD-MCNC: 130 MG/DL (ref 70–99)
HBA1C MFR BLD: 7.8 % (ref ?–5.7)
HCT VFR BLD AUTO: 39.5 %
HDLC SERPL-MCNC: 69 MG/DL (ref 40–59)
HGB BLD-MCNC: 13 G/DL
IMM GRANULOCYTES # BLD AUTO: 0.02 X10(3) UL (ref 0–1)
IMM GRANULOCYTES NFR BLD: 0.5 %
LDLC SERPL CALC-MCNC: 65 MG/DL (ref ?–100)
LYMPHOCYTES # BLD AUTO: 1.78 X10(3) UL (ref 1–4)
LYMPHOCYTES NFR BLD AUTO: 40.7 %
MCH RBC QN AUTO: 30 PG (ref 26–34)
MCHC RBC AUTO-ENTMCNC: 32.9 G/DL (ref 31–37)
MCV RBC AUTO: 91.2 FL
MICROALBUMIN UR-MCNC: <0.3 MG/DL
MONOCYTES # BLD AUTO: 0.44 X10(3) UL (ref 0.1–1)
MONOCYTES NFR BLD AUTO: 10.1 %
NEUTROPHILS # BLD AUTO: 1.86 X10 (3) UL (ref 1.5–7.7)
NEUTROPHILS # BLD AUTO: 1.86 X10(3) UL (ref 1.5–7.7)
NEUTROPHILS NFR BLD AUTO: 42.5 %
NONHDLC SERPL-MCNC: 87 MG/DL (ref ?–130)
OSMOLALITY SERPL CALC.SUM OF ELEC: 292 MOSM/KG (ref 275–295)
PLATELET # BLD AUTO: 196 10(3)UL (ref 150–450)
POTASSIUM SERPL-SCNC: 4.3 MMOL/L (ref 3.5–5.1)
PROT SERPL-MCNC: 7 G/DL (ref 5.7–8.2)
RBC # BLD AUTO: 4.33 X10(6)UL
SODIUM SERPL-SCNC: 140 MMOL/L (ref 136–145)
TRIGL SERPL-MCNC: 124 MG/DL (ref 30–149)
TSI SER-ACNC: 2.15 UIU/ML (ref 0.55–4.78)
VLDLC SERPL CALC-MCNC: 19 MG/DL (ref 0–30)
WBC # BLD AUTO: 4.4 X10(3) UL (ref 4–11)

## 2025-03-05 PROCEDURE — 80053 COMPREHEN METABOLIC PANEL: CPT

## 2025-03-05 PROCEDURE — 84443 ASSAY THYROID STIM HORMONE: CPT

## 2025-03-05 PROCEDURE — 83036 HEMOGLOBIN GLYCOSYLATED A1C: CPT

## 2025-03-05 PROCEDURE — 77067 SCR MAMMO BI INCL CAD: CPT | Performed by: FAMILY MEDICINE

## 2025-03-05 PROCEDURE — 82043 UR ALBUMIN QUANTITATIVE: CPT

## 2025-03-05 PROCEDURE — 82570 ASSAY OF URINE CREATININE: CPT

## 2025-03-05 PROCEDURE — 77063 BREAST TOMOSYNTHESIS BI: CPT | Performed by: FAMILY MEDICINE

## 2025-03-05 PROCEDURE — 85025 COMPLETE CBC W/AUTO DIFF WBC: CPT

## 2025-03-05 PROCEDURE — 80061 LIPID PANEL: CPT

## 2025-03-05 PROCEDURE — 36415 COLL VENOUS BLD VENIPUNCTURE: CPT

## 2025-03-14 ENCOUNTER — OFFICE VISIT (OUTPATIENT)
Dept: FAMILY MEDICINE CLINIC | Facility: CLINIC | Age: 70
End: 2025-03-14
Payer: MEDICARE

## 2025-03-14 VITALS
RESPIRATION RATE: 14 BRPM | HEIGHT: 61 IN | OXYGEN SATURATION: 98 % | SYSTOLIC BLOOD PRESSURE: 110 MMHG | WEIGHT: 185.63 LBS | BODY MASS INDEX: 35.05 KG/M2 | DIASTOLIC BLOOD PRESSURE: 70 MMHG | HEART RATE: 78 BPM

## 2025-03-14 DIAGNOSIS — E78.2 MIXED HYPERLIPIDEMIA: ICD-10-CM

## 2025-03-14 DIAGNOSIS — I10 ESSENTIAL HYPERTENSION: Chronic | ICD-10-CM

## 2025-03-14 DIAGNOSIS — E66.01 SEVERE OBESITY (BMI 35.0-35.9 WITH COMORBIDITY) (HCC): ICD-10-CM

## 2025-03-14 DIAGNOSIS — G35 MULTIPLE SCLEROSIS (HCC): Chronic | ICD-10-CM

## 2025-03-14 DIAGNOSIS — E11.65 TYPE 2 DIABETES MELLITUS WITH HYPERGLYCEMIA, WITHOUT LONG-TERM CURRENT USE OF INSULIN (HCC): Primary | ICD-10-CM

## 2025-03-14 DIAGNOSIS — E03.9 ACQUIRED HYPOTHYROIDISM: ICD-10-CM

## 2025-03-14 PROCEDURE — G2211 COMPLEX E/M VISIT ADD ON: HCPCS | Performed by: FAMILY MEDICINE

## 2025-03-14 PROCEDURE — 99214 OFFICE O/P EST MOD 30 MIN: CPT | Performed by: FAMILY MEDICINE

## 2025-03-14 RX ORDER — ATORVASTATIN CALCIUM 20 MG/1
20 TABLET, FILM COATED ORAL DAILY
Qty: 90 TABLET | Refills: 3 | Status: SHIPPED | OUTPATIENT
Start: 2025-03-14 | End: 2026-03-09

## 2025-03-14 RX ORDER — LEVOTHYROXINE SODIUM 75 UG/1
75 TABLET ORAL DAILY
Qty: 90 TABLET | Refills: 3 | Status: SHIPPED | OUTPATIENT
Start: 2025-03-14

## 2025-03-14 RX ORDER — LOSARTAN POTASSIUM 25 MG/1
25 TABLET ORAL DAILY
Qty: 90 TABLET | Refills: 3 | Status: SHIPPED | OUTPATIENT
Start: 2025-03-14

## 2025-03-14 NOTE — ASSESSMENT & PLAN NOTE
Thyroid shows Good control.   3/5/2025: TSH 2.145  Thryoid Meds: levothyroxine Tabs - 75 MCG well controlled current treatment plan effective, no change in therapy     Orders:    Levothyroxine Sodium; Take 1 tablet (75 mcg total) by mouth daily.  Dispense: 90 tablet; Refill: 3

## 2025-03-14 NOTE — ASSESSMENT & PLAN NOTE
BP shows good control with last BP of 110/70. Continue lifestyle changes, diet, exercise and weight loss.   3/5/2025: Potassium 4.3; Creatinine 0.83; eGFR-Cr 77  BP Meds: losartan Tabs - 25 MG   ACE/ARB Adherence Poor at 25% poor adherence working on imrpvement.       Orders:    Losartan Potassium; Take 1 tablet (25 mg total) by mouth daily.  Dispense: 90 tablet; Refill: 3

## 2025-03-14 NOTE — PROGRESS NOTES
Subjective:   Sharri is a 69 year old female coming in for had concerns including Blood Pressure (6 months follow up ).   HPI  History of Present Illness  Sharri Montgomery is a 69 year old female with type 2 diabetes and hypertension who presents for medication management and follow-up.    She is here for a follow-up visit primarily to discuss her diabetes management. Her recent A1c level has increased to 7.8% from 7.2% in September, which she attributes to dietary changes during the holiday season, including purchasing [employer] chocolates for her grandchildren's fundraising activities. She generally does not eat much except during the holidays.    Regarding her medication regimen, she takes atorvastatin and losartan every other day, which has led to a discrepancy in her medication adherence reports. She explains that this dosing schedule is intentional and aligns with her needs, although it conflicts with her insurance company's expectations. She takes levothyroxine daily without any issues.    She mentions that she had COVID-19 in September and has not received a COVID-19 vaccine since then, believing she has sufficient antibodies from the infection. She also received a pneumonia vaccine in 2022 and is up to date with her immunizations.     /70   Pulse 78   Resp 14   Ht 5' 1\" (1.549 m)   Wt 185 lb 9.6 oz (84.2 kg)   LMP  (LMP Unknown)   SpO2 98%   BMI 35.07 kg/m²  Body mass index is 35.07 kg/m².   Physical Exam  Vitals and nursing note reviewed.   Constitutional:       General: She is not in acute distress.     Appearance: Normal appearance. She is obese.   HENT:      Head: Normocephalic.   Cardiovascular:      Rate and Rhythm: Normal rate and regular rhythm.      Pulses:           Posterior tibial pulses are 2+ on the right side and 2+ on the left side.      Heart sounds: Normal heart sounds. No murmur heard.  Pulmonary:      Effort: Pulmonary effort is normal. No respiratory distress.      Breath  sounds: Normal breath sounds. No wheezing.   Abdominal:      General: Bowel sounds are normal.      Palpations: Abdomen is soft.      Tenderness: There is no abdominal tenderness.   Musculoskeletal:      Cervical back: Normal range of motion.      Right lower leg: No edema.      Left lower leg: No edema.      Right foot: No deformity.      Left foot: No deformity.   Feet:      Right foot:      Protective Sensation: 6 sites tested.  6 sites sensed.      Skin integrity: Skin integrity normal. No ulcer.      Left foot:      Protective Sensation: 6 sites tested.  6 sites sensed.      Skin integrity: Skin integrity normal. No ulcer.   Skin:     General: Skin is warm and dry.      Findings: No rash.   Neurological:      Mental Status: She is alert and oriented to person, place, and time.   Psychiatric:         Mood and Affect: Mood normal.         Behavior: Behavior normal.         Thought Content: Thought content normal.         Judgment: Judgment normal.        Physical Exam  VITALS: BP- 110/70  EXTREMITIES: Feet normal on examination. Ankle pulse palpable and normal. No cyanosis or edema.   Bilateral barefoot skin diabetic exam is normal, visualized feet and the appearance is normal.  Bilateral monofilament/sensation of both feet is normal.  Pulsation pedal pulse exam of both lower legs/feet is normal as well.         Results  LABS  TSH: 2.14 µIU/mL  HbA1c: 7.8%     Assessment & Plan  Type 2 diabetes mellitus with hyperglycemia, without long-term current use of insulin (HCC)  Diabetes: A1c is 7.8 done 3/5/2025 which shows hyperglycemia and borderline control, maintain vigilance and increase activity and medications as listed.   DM Meds: metFORMIN ER Tb24 - 500 MG   Oral Diabetes Med Adherence Good at 92%    Diabetic Complications: Hyperglycemia: A1c 7.8% 3/5/2025, .  MicroAlbuminuria: 32.5 mg/dL  Proteinuria: 32.5 mg/dL  CKD 2 (GFR 77).   Cataract history    Diabetes is : stable Continue current treatment  regimen. Reassess Diabetes in : in 6 months stable cataractrs. Stable proteinuria. Continue to monitor and continue present management     Orders:    Comp Metabolic Panel (14); Future; Expected date: 06/12/2025    Hemoglobin A1C; Future; Expected date: 06/12/2025    Severe obesity (BMI 35.0-35.9 with comorbidity) ([facility])  Last BMI: 35.07, Class 2 Obesity.  She was counseled on diet and exercise for elevated BMI which is affecting her Diabetes, hypertension, hyperlipidemia, and osteoarthritis.          Multiple sclerosis (HCC)  Diagnosis in earlier adulthood. Currently stable . No current meds. Last MR 2018. Stable. Continue to monitor and continue present management          Mixed hyperlipidemia  Cholesterol shows Good control. Long term heart-healthy diet and lifestyle discussed and encouraged to reduce risk of cardiovascular disease.  3/5/2025: Cholesterol, Total 156; HDL Cholesterol 69 (H); Triglycerides 124; LDL Cholesterol 65  Cholesterol Meds: atorvastatin Tabs - 20 MG  stable  Continue with current treatment plan     Orders:    Atorvastatin Calcium; Take 1 tablet (20 mg total) by mouth daily.  Dispense: 90 tablet; Refill: 3    Essential hypertension  BP shows good control with last BP of 110/70. Continue lifestyle changes, diet, exercise and weight loss.   3/5/2025: Potassium 4.3; Creatinine 0.83; eGFR-Cr 77  BP Meds: losartan Tabs - 25 MG   ACE/ARB Adherence Poor at 25% poor adherence working on [employer] (mytaxi)pvement.       Orders:    Losartan Potassium; Take 1 tablet (25 mg total) by mouth daily.  Dispense: 90 tablet; Refill: 3    Acquired hypothyroidism  Thyroid shows Good control.   3/5/2025: TSH 2.145  Thryoid Meds: levothyroxine Tabs - 75 MCG well controlled current treatment plan effective, no change in therapy     Orders:    Levothyroxine Sodium; Take 1 tablet (75 mcg total) by mouth daily.  Dispense: 90 tablet; Refill: 3             Assessment & Plan  Type 2 diabetes mellitus with hyperglycemia    A1c increased  to 7.8, indicating worsening glycemic control. Discussed weight loss medications, but cost is prohibitive. Victoza generic available mid-April may offer affordable option.    - Order A1c and comprehensive metabolic panel in three months.    - Encourage increased physical activity as weather permits.    - Discuss potential future options for weight loss medications as she becomes more affordable.      Essential hypertension    Blood pressure well-controlled at 110/70 mmHg. Current losartan regimen adequate despite non-adherence to prescribed schedule. Insurance issues discussed.    - Continue current losartan regimen as tolerated.    - Monitor blood pressure regularly.      Mixed hyperlipidemia    Adequate lipid control with atorvastatin every other day. Insurance issues discussed.    - Continue current atorvastatin regimen as tolerated.    - Consider [facility] for atorvastatin to reduce cost.      Acquired hypothyroidism    Compliant with daily levothyroxine. TSH levels at 2.14.    - Continue current levothyroxine regimen.      General Health Maintenance    Up to date with pneumonia vaccination. Discussed COVID-19 vaccination recommendations. Recent COVID-19 infection provides six months protection.    - Consider COVID-19 booster if there is an increase in cases.      Follow-up    Labs in three months to reassess A1c and metabolic panel. Routine follow-up in six months planned.    - Schedule follow-up appointment in six months.    - Remind to have labs drawn in three months.  I have discontinued Sharri Montgomery's Accu-Chek SmartView. I have also changed her levothyroxine. Additionally, I am having her maintain her B Complex Vitamins (B COMPLEX 1 OR), Vitamin D, metFORMIN ER, Accu-Chek Guide, atorvastatin, and losartan.       Return in about 6 months (around 9/14/2025) for recheck.

## 2025-03-14 NOTE — ASSESSMENT & PLAN NOTE
Last BMI: 35.07, Class 2 Obesity.  She was counseled on diet and exercise for elevated BMI which is affecting her Diabetes, hypertension, hyperlipidemia, and osteoarthritis.

## 2025-03-14 NOTE — ASSESSMENT & PLAN NOTE
Diagnosis in earlier adulthood. Currently stable . No current meds. Last MR 2018. Stable. Continue to monitor and continue present management

## 2025-03-14 NOTE — ASSESSMENT & PLAN NOTE
Cholesterol shows Good control. Long term heart-healthy diet and lifestyle discussed and encouraged to reduce risk of cardiovascular disease.  3/5/2025: Cholesterol, Total 156; HDL Cholesterol 69 (H); Triglycerides 124; LDL Cholesterol 65  Cholesterol Meds: atorvastatin Tabs - 20 MG  stable  Continue with current treatment plan     Orders:    Atorvastatin Calcium; Take 1 tablet (20 mg total) by mouth daily.  Dispense: 90 tablet; Refill: 3

## 2025-03-14 NOTE — ASSESSMENT & PLAN NOTE
Diabetes: A1c is 7.8 done 3/5/2025 which shows hyperglycemia and borderline control, maintain vigilance and increase activity and medications as listed.   DM Meds: metFORMIN ER Tb24 - 500 MG   Oral Diabetes Med Adherence Good at 92%    Diabetic Complications: Hyperglycemia: A1c 7.8% 3/5/2025, .  MicroAlbuminuria: 32.5 mg/dL  Proteinuria: 32.5 mg/dL  CKD 2 (GFR 77).   Cataract history    Diabetes is : stable Continue current treatment regimen. Reassess Diabetes in : in 6 months stable cataractrs. Stable proteinuria. Continue to monitor and continue present management     Orders:    Comp Metabolic Panel (14); Future; Expected date: 06/12/2025    Hemoglobin A1C; Future; Expected date: 06/12/2025

## 2025-06-11 ENCOUNTER — LAB ENCOUNTER (OUTPATIENT)
Dept: LAB | Age: 70
End: 2025-06-11
Attending: FAMILY MEDICINE
Payer: MEDICARE

## 2025-06-11 DIAGNOSIS — E11.65 TYPE 2 DIABETES MELLITUS WITH HYPERGLYCEMIA, WITHOUT LONG-TERM CURRENT USE OF INSULIN (HCC): ICD-10-CM

## 2025-06-11 LAB
ALBUMIN SERPL-MCNC: 4.9 G/DL (ref 3.2–4.8)
ALBUMIN/GLOB SERPL: 2.5 {RATIO} (ref 1–2)
ALP LIVER SERPL-CCNC: 51 U/L (ref 55–142)
ALT SERPL-CCNC: 28 U/L (ref 10–49)
ANION GAP SERPL CALC-SCNC: 12 MMOL/L (ref 0–18)
AST SERPL-CCNC: 23 U/L (ref ?–34)
BILIRUB SERPL-MCNC: 0.7 MG/DL (ref 0.2–1.1)
BUN BLD-MCNC: 11 MG/DL (ref 9–23)
CALCIUM BLD-MCNC: 10.1 MG/DL (ref 8.7–10.6)
CHLORIDE SERPL-SCNC: 102 MMOL/L (ref 98–112)
CO2 SERPL-SCNC: 25 MMOL/L (ref 21–32)
CREAT BLD-MCNC: 0.72 MG/DL (ref 0.55–1.02)
EGFRCR SERPLBLD CKD-EPI 2021: 91 ML/MIN/1.73M2 (ref 60–?)
EST. AVERAGE GLUCOSE BLD GHB EST-MCNC: 166 MG/DL (ref 68–126)
FASTING STATUS PATIENT QL REPORTED: YES
GLOBULIN PLAS-MCNC: 2 G/DL (ref 2–3.5)
GLUCOSE BLD-MCNC: 137 MG/DL (ref 70–99)
HBA1C MFR BLD: 7.4 % (ref ?–5.7)
OSMOLALITY SERPL CALC.SUM OF ELEC: 290 MOSM/KG (ref 275–295)
POTASSIUM SERPL-SCNC: 4.3 MMOL/L (ref 3.5–5.1)
PROT SERPL-MCNC: 6.9 G/DL (ref 5.7–8.2)
SODIUM SERPL-SCNC: 139 MMOL/L (ref 136–145)

## 2025-06-11 PROCEDURE — 83036 HEMOGLOBIN GLYCOSYLATED A1C: CPT

## 2025-06-11 PROCEDURE — 80053 COMPREHEN METABOLIC PANEL: CPT

## 2025-06-11 PROCEDURE — 36415 COLL VENOUS BLD VENIPUNCTURE: CPT

## 2025-06-16 ENCOUNTER — PATIENT MESSAGE (OUTPATIENT)
Dept: FAMILY MEDICINE CLINIC | Facility: CLINIC | Age: 70
End: 2025-06-16

## (undated) DIAGNOSIS — E78.2 MIXED HYPERLIPIDEMIA: ICD-10-CM

## (undated) DIAGNOSIS — R73.9 HYPERGLYCEMIA: ICD-10-CM

## (undated) DIAGNOSIS — Z00.00 LABORATORY EXAMINATION ORDERED AS PART OF A ROUTINE GENERAL MEDICAL EXAMINATION: ICD-10-CM

## (undated) DIAGNOSIS — E11.9 TYPE 2 DIABETES MELLITUS WITHOUT COMPLICATION, WITHOUT LONG-TERM CURRENT USE OF INSULIN (HCC): Primary | ICD-10-CM

## (undated) DIAGNOSIS — I10 ESSENTIAL HYPERTENSION: ICD-10-CM

## (undated) NOTE — Clinical Note
01/23/2017  Nupur Coronado MD  • Brigitte Mathias MD • Kajal Olivo MD • Kristina Quick DO  [facility], [facility] • RADHA Elise • Daniel Valdes PA-C     Eye Exam Results for Diabetic Patients     As soon as the patient is seen please complete th

## (undated) NOTE — MR AVS SNAPSHOT
[facility] [place]  [place],  [address] [place] [address] [phone]               Thank you for choosing us for your health care visit with Waylon Snowden MD.  We are glad to serve you and happy to provide you with this summa Take 1 tablet (5 mg total) by mouth daily.    Commonly known as:  PRINIVIL,ZESTRIL           MetFORMIN HCl 500 MG Tabs   Take 2 tablets (1,000 mg total) by mouth daily with breakfast.   Commonly known as:  GLUCOPHAGE           multivitamin Tabs   Take 1 Tab

## (undated) NOTE — MR AVS SNAPSHOT
[facility] [place]  [place],  [address] [place] [address] [phone]               Thank you for choosing us for your health care visit with Tomás Godinez MD.  We are glad to serve you and happy to provide you with this summa Commonly known as:  ACCU-CHEK SMARTVIEW           Levothyroxine Sodium 75 MCG Tabs   Take 1 tablet (75 mcg total) by mouth daily. Commonly known as:  SYNTHROID, LEVOTHROID           lisinopril 5 MG Tabs   Take 1 tablet (5 mg total) by mouth daily.    Comm

## (undated) NOTE — LETTER
[address] [facility], [address] [facility], [address] [address] [address]  [facility]  [phone]        Date: 2017     Patient Name: Della Andrea   :   1955   Date of Surgery:  11/15/2017      Dear Dr Mica He,

## (undated) NOTE — MR AVS SNAPSHOT
[address]  [address] [facility] [address] [phone]               Thank you for choosing us for your health care visit with [facility] Moya.   We are glad to serve you and happy to provide you with this summary o UTI. This means women develop UTIs more often than men. Pain in or around the urinary tract is a common UTI symptom. But the only way to know for sure if you have a UTI for the health care provider to test your urine.  The two tests that may be done are the · Practice good personal hygiene. Wipe yourself from front to back after using the toilet. This helps keep bacteria from getting into the urethra. · Use condoms during sex. These help prevent UTIs caused by sexually transmitted bacteria.  Also, avoid using Commonly known as:  PRINIVIL,ZESTRIL           MetFORMIN HCl 500 MG Tabs   Take 2 tablets (1,000 mg total) by mouth daily with breakfast.   Commonly known as:  GLUCOPHAGE           multivitamin Tabs   Take 1 Tab by mouth daily.            Nitrofurantoin Mon For medical emergencies, dial 911. Educational Information     Healthy Diet and Regular Exercise  [employer] for making healthy food choices  -   Enjoy your food, but eat less. Fully enjoy your food when eating.    Don’t

## (undated) NOTE — Clinical Note
01/23/2017            Dear Patient:    I am writing to you to remind you to schedule your annual diabetic eye exam.  Diabetes remains one of the leading causes of blindness in American adults.   Early diagnosis is important in preventing the progression to

## (undated) NOTE — Clinical Note
[facility], [address] [facility], [address] [address] [address] [place] [address], [facility] [address] [facility] [phone]        Devon Escobar MD  • Paula Hernandez MD • Ashtyn De Luna MD • DO Alba Mark PA-C • RADHA Mendez

## (undated) NOTE — LETTER
Sadi Cummings MD  • Vilma Giron MD • Maribell Sims MD • Camilo Johns DO  1.  Georgina Slaughter PA-C • Kumar Melara PA-C, Oralia Hui, KAMI    Eye Exam Results for Diabetic Patients    As soon as the patient is seen please complete the form below a attached form to your appointment for your eye doctor to fill out and fax back to our office. If you have already had an eye exam in 2019 please call our office so we can update your chart with your eye doctor's name and the date of your visit.  Our phon